# Patient Record
Sex: MALE | NOT HISPANIC OR LATINO
[De-identification: names, ages, dates, MRNs, and addresses within clinical notes are randomized per-mention and may not be internally consistent; named-entity substitution may affect disease eponyms.]

---

## 2017-08-07 ENCOUNTER — OTHER (OUTPATIENT)
Age: 82
End: 2017-08-07

## 2017-08-07 DIAGNOSIS — R93.8 ABNORMAL FINDINGS ON DIAGNOSTIC IMAGING OF OTHER SPECIFIED BODY STRUCTURES: ICD-10-CM

## 2021-09-07 ENCOUNTER — OUTPATIENT (OUTPATIENT)
Dept: OUTPATIENT SERVICES | Facility: HOSPITAL | Age: 86
LOS: 1 days | End: 2021-09-07
Payer: MEDICARE

## 2021-09-07 ENCOUNTER — APPOINTMENT (OUTPATIENT)
Dept: RADIOLOGY | Facility: CLINIC | Age: 86
End: 2021-09-07
Payer: MEDICARE

## 2021-09-07 DIAGNOSIS — R05 COUGH: ICD-10-CM

## 2021-09-07 PROCEDURE — 71046 X-RAY EXAM CHEST 2 VIEWS: CPT | Mod: 26

## 2021-09-07 PROCEDURE — 71046 X-RAY EXAM CHEST 2 VIEWS: CPT

## 2021-09-08 ENCOUNTER — NON-APPOINTMENT (OUTPATIENT)
Age: 86
End: 2021-09-08

## 2021-09-08 ENCOUNTER — APPOINTMENT (OUTPATIENT)
Dept: PULMONOLOGY | Facility: CLINIC | Age: 86
End: 2021-09-08
Payer: MEDICARE

## 2021-09-08 VITALS
WEIGHT: 170 LBS | SYSTOLIC BLOOD PRESSURE: 124 MMHG | HEIGHT: 71.5 IN | DIASTOLIC BLOOD PRESSURE: 62 MMHG | BODY MASS INDEX: 23.28 KG/M2

## 2021-09-08 VITALS — OXYGEN SATURATION: 97 % | HEART RATE: 76 BPM | RESPIRATION RATE: 16 BRPM

## 2021-09-08 DIAGNOSIS — I34.0 NONRHEUMATIC MITRAL (VALVE) INSUFFICIENCY: ICD-10-CM

## 2021-09-08 DIAGNOSIS — I35.0 NONRHEUMATIC AORTIC (VALVE) STENOSIS: ICD-10-CM

## 2021-09-08 DIAGNOSIS — I48.91 UNSPECIFIED ATRIAL FIBRILLATION: ICD-10-CM

## 2021-09-08 DIAGNOSIS — R05 COUGH: ICD-10-CM

## 2021-09-08 DIAGNOSIS — E78.5 HYPERLIPIDEMIA, UNSPECIFIED: ICD-10-CM

## 2021-09-08 PROCEDURE — 99204 OFFICE O/P NEW MOD 45 MIN: CPT

## 2021-09-08 RX ORDER — UBIDECARENONE/VIT E ACET 100MG-5
CAPSULE ORAL
Refills: 0 | Status: ACTIVE | COMMUNITY

## 2021-09-08 RX ORDER — PSYLLIUM HUSK 0.4 G
CAPSULE ORAL
Refills: 0 | Status: ACTIVE | COMMUNITY

## 2021-09-08 RX ORDER — METOPROLOL TARTRATE 25 MG/1
25 TABLET, FILM COATED ORAL
Refills: 0 | Status: ACTIVE | COMMUNITY

## 2021-09-08 RX ORDER — CALCIUM CITRATE/VITAMIN D3 315MG-6.25
315-200 TABLET ORAL
Refills: 0 | Status: ACTIVE | COMMUNITY

## 2021-09-08 RX ORDER — RIVAROXABAN 20 MG/1
20 TABLET, FILM COATED ORAL
Refills: 0 | Status: ACTIVE | COMMUNITY

## 2021-09-08 NOTE — HISTORY OF PRESENT ILLNESS
[Never] : never [TextBox_4] : 88-year-old male last seen in this office greater than 5 years ago, seen today for complaints of a cough over the course of the week. 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 One month ago, treated with successful cardioversion. Followup EDDIE performed at that time demonstrated severe mitral regurgitation, and the patient is having a mitral valve clip.\par \par Patient seen today in reevaluation for complaints of cough, which began to have weeks ago. He was attending a wedding and subsequently developed pharyngitis with cough, productive of yellow sputum. He underwent Covid 19 testing x3, which was negative and is completing a course of cefpodoxime proxetil and azithromycin. He denies any fevers, chills, myalgias, arthralgias. His cough has significantly improved. There is no worsening with sleep or sleep disturbance. History is negative for leg edema, proximal nocturnal dyspnea, or orthopnea.

## 2021-09-08 NOTE — PROCEDURE
[FreeTextEntry1] : 9/7/21: [facility] chest x-ray-increased pulmonary vascular congestion with cephalization of vessels. No evidence of pleural effusions. No Masses or infiltrates. Films reviewed on PACS with patient\par \par Cardiology evaluation from 9/7/21 was obtained and reviewed

## 2021-09-08 NOTE — CONSULT LETTER
[Dear  ___] : Dear  [unfilled], [Consult Letter:] : I had the pleasure of evaluating your patient, [unfilled]. [Please see my note below.] : Please see my note below. [Consult Closing:] : Thank you very much for allowing me to participate in the care of this patient.  If you have any questions, please do not hesitate to contact me. [Sincerely,] : Sincerely, [DrWilliam  ___] : Dr. MUNOZ [FreeTextEntry3] : Alex Pisano MD FCCP\par Pulmonary/Critical Care/Sleep Medicine\par Department of Internal Medicine\par \par [employer] [facility] [employer]\par

## 2021-09-08 NOTE — PHYSICAL EXAM
[No Acute Distress] : no acute distress [Normal Oropharynx] : normal oropharynx [Normal Appearance] : normal appearance [No Neck Mass] : no neck mass [Normal Rate/Rhythm] : normal rate/rhythm [Normal S1, S2] : normal s1, s2 [No Murmurs] : no murmurs [No Resp Distress] : no resp distress [No Abnormalities] : no abnormalities [Benign] : benign [Normal Gait] : normal gait [No Clubbing] : no clubbing [No Cyanosis] : no cyanosis [No Edema] : no edema [FROM] : FROM [Normal Color/ Pigmentation] : normal color/ pigmentation [No Focal Deficits] : no focal deficits [Oriented x3] : oriented x3 [Normal Affect] : normal affect [TextBox_68] : scattered expiratory rhonchi and wheezes at both bases

## 2021-09-08 NOTE — DISCUSSION/SUMMARY
[FreeTextEntry1] : 88-year-old male, seen today for the above. Patient complains most likely the basis of community-acquired bronchitis with possible reactive airways disease. Questionable component of sinusitis. I did suggest the use of steroids to expedite clearance, but the patient wishes to hold off. Over-the-counter expectorant with mild cough suppressant will be started.\par \par Severe mitral regurgitation with evidence of pulmonary vascular congestion on chest x-ray. No evidence of overt heart failure. Have strongly advised on proceeding with mitral valve repair.

## 2022-08-30 ENCOUNTER — NON-APPOINTMENT (OUTPATIENT)
Age: 87
End: 2022-08-30

## 2023-05-04 ENCOUNTER — OUTPATIENT (OUTPATIENT)
Dept: OUTPATIENT SERVICES | Facility: HOSPITAL | Age: 88
LOS: 1 days | Discharge: HOME | End: 2023-05-04

## 2023-05-04 DIAGNOSIS — C79.82 SECONDARY MALIGNANT NEOPLASM OF GENITAL ORGANS: ICD-10-CM

## 2023-05-05 ENCOUNTER — NON-APPOINTMENT (OUTPATIENT)
Age: 88
End: 2023-05-05

## 2023-05-08 ENCOUNTER — APPOINTMENT (OUTPATIENT)
Dept: HEMATOLOGY ONCOLOGY | Facility: CLINIC | Age: 88
End: 2023-05-08
Payer: MEDICARE

## 2023-05-08 ENCOUNTER — TRANSCRIPTION ENCOUNTER (OUTPATIENT)
Age: 88
End: 2023-05-08

## 2023-05-08 ENCOUNTER — RESULT REVIEW (OUTPATIENT)
Age: 88
End: 2023-05-08

## 2023-05-08 ENCOUNTER — OUTPATIENT (OUTPATIENT)
Dept: OUTPATIENT SERVICES | Facility: HOSPITAL | Age: 88
LOS: 1 days | End: 2023-05-08
Payer: MEDICARE

## 2023-05-08 VITALS
SYSTOLIC BLOOD PRESSURE: 138 MMHG | HEART RATE: 64 BPM | RESPIRATION RATE: 16 BRPM | DIASTOLIC BLOOD PRESSURE: 61 MMHG | OXYGEN SATURATION: 98 % | TEMPERATURE: 98 F

## 2023-05-08 VITALS
HEIGHT: 71.5 IN | OXYGEN SATURATION: 91 % | DIASTOLIC BLOOD PRESSURE: 74 MMHG | HEART RATE: 82 BPM | SYSTOLIC BLOOD PRESSURE: 127 MMHG | TEMPERATURE: 97.9 F | BODY MASS INDEX: 22.4 KG/M2 | WEIGHT: 163.56 LBS

## 2023-05-08 VITALS
SYSTOLIC BLOOD PRESSURE: 119 MMHG | DIASTOLIC BLOOD PRESSURE: 85 MMHG | RESPIRATION RATE: 16 BRPM | OXYGEN SATURATION: 100 % | HEART RATE: 57 BPM

## 2023-05-08 DIAGNOSIS — R06.02 SHORTNESS OF BREATH: ICD-10-CM

## 2023-05-08 LAB
ANION GAP SERPL CALC-SCNC: 10 MMOL/L — SIGNIFICANT CHANGE UP (ref 5–17)
BASOPHILS # BLD AUTO: 0.05 K/UL — SIGNIFICANT CHANGE UP (ref 0–0.2)
BASOPHILS # BLD AUTO: 0.1 K/UL — SIGNIFICANT CHANGE UP (ref 0–0.2)
BASOPHILS NFR BLD AUTO: 0.8 % — SIGNIFICANT CHANGE UP (ref 0–2)
BASOPHILS NFR BLD AUTO: 0.9 % — SIGNIFICANT CHANGE UP (ref 0–2)
BUN SERPL-MCNC: 45.2 MG/DL — HIGH (ref 8–20)
CALCIUM SERPL-MCNC: 9.2 MG/DL — SIGNIFICANT CHANGE UP (ref 8.4–10.5)
CHLORIDE SERPL-SCNC: 102 MMOL/L — SIGNIFICANT CHANGE UP (ref 96–108)
CO2 SERPL-SCNC: 27 MMOL/L — SIGNIFICANT CHANGE UP (ref 22–29)
CREAT SERPL-MCNC: 1.6 MG/DL — HIGH (ref 0.5–1.3)
EGFR: 41 ML/MIN/1.73M2 — LOW
EOSINOPHIL # BLD AUTO: 0.2 K/UL — SIGNIFICANT CHANGE UP (ref 0–0.5)
EOSINOPHIL # BLD AUTO: 0.26 K/UL — SIGNIFICANT CHANGE UP (ref 0–0.5)
EOSINOPHIL NFR BLD AUTO: 2.9 % — SIGNIFICANT CHANGE UP (ref 0–6)
EOSINOPHIL NFR BLD AUTO: 4.8 % — SIGNIFICANT CHANGE UP (ref 0–6)
GLUCOSE SERPL-MCNC: 101 MG/DL — HIGH (ref 70–99)
HCT VFR BLD CALC: 35.9 % — LOW (ref 39–50)
HCT VFR BLD CALC: 36.1 % — LOW (ref 39–50)
HGB BLD-MCNC: 11.8 G/DL — LOW (ref 13–17)
HGB BLD-MCNC: 11.9 G/DL — LOW (ref 13–17)
IMM GRANULOCYTES NFR BLD AUTO: 0.2 % — SIGNIFICANT CHANGE UP (ref 0–0.9)
LYMPHOCYTES # BLD AUTO: 1.1 K/UL — SIGNIFICANT CHANGE UP (ref 1–3.3)
LYMPHOCYTES # BLD AUTO: 1.16 K/UL — SIGNIFICANT CHANGE UP (ref 1–3.3)
LYMPHOCYTES # BLD AUTO: 16.5 % — SIGNIFICANT CHANGE UP (ref 13–44)
LYMPHOCYTES # BLD AUTO: 21.5 % — SIGNIFICANT CHANGE UP (ref 13–44)
MAGNESIUM SERPL-MCNC: 2.4 MG/DL — SIGNIFICANT CHANGE UP (ref 1.6–2.6)
MCHC RBC-ENTMCNC: 29.5 PG — SIGNIFICANT CHANGE UP (ref 27–34)
MCHC RBC-ENTMCNC: 29.8 PG — SIGNIFICANT CHANGE UP (ref 27–34)
MCHC RBC-ENTMCNC: 32.8 G/DL — SIGNIFICANT CHANGE UP (ref 32–36)
MCHC RBC-ENTMCNC: 33.1 GM/DL — SIGNIFICANT CHANGE UP (ref 32–36)
MCV RBC AUTO: 88.9 FL — SIGNIFICANT CHANGE UP (ref 80–100)
MCV RBC AUTO: 90.8 FL — SIGNIFICANT CHANGE UP (ref 80–100)
MONOCYTES # BLD AUTO: 0.65 K/UL — SIGNIFICANT CHANGE UP (ref 0–0.9)
MONOCYTES # BLD AUTO: 0.7 K/UL — SIGNIFICANT CHANGE UP (ref 0–0.9)
MONOCYTES NFR BLD AUTO: 10.7 % — SIGNIFICANT CHANGE UP (ref 2–14)
MONOCYTES NFR BLD AUTO: 12.1 % — SIGNIFICANT CHANGE UP (ref 2–14)
NEUTROPHILS # BLD AUTO: 3.26 K/UL — SIGNIFICANT CHANGE UP (ref 1.8–7.4)
NEUTROPHILS # BLD AUTO: 4.6 K/UL — SIGNIFICANT CHANGE UP (ref 1.8–7.4)
NEUTROPHILS NFR BLD AUTO: 60.5 % — SIGNIFICANT CHANGE UP (ref 43–77)
NEUTROPHILS NFR BLD AUTO: 69 % — SIGNIFICANT CHANGE UP (ref 43–77)
PLATELET # BLD AUTO: 176 K/UL — SIGNIFICANT CHANGE UP (ref 150–400)
PLATELET # BLD AUTO: 177 K/UL — SIGNIFICANT CHANGE UP (ref 150–400)
POTASSIUM SERPL-MCNC: 4.4 MMOL/L — SIGNIFICANT CHANGE UP (ref 3.5–5.3)
POTASSIUM SERPL-SCNC: 4.4 MMOL/L — SIGNIFICANT CHANGE UP (ref 3.5–5.3)
RBC # BLD: 3.98 M/UL — LOW (ref 4.2–5.8)
RBC # BLD: 4.04 M/UL — LOW (ref 4.2–5.8)
RBC # FLD: 14.3 % — SIGNIFICANT CHANGE UP (ref 10.3–14.5)
RBC # FLD: 15.1 % — HIGH (ref 10.3–14.5)
SODIUM SERPL-SCNC: 139 MMOL/L — SIGNIFICANT CHANGE UP (ref 135–145)
WBC # BLD: 5.39 K/UL — SIGNIFICANT CHANGE UP (ref 3.8–10.5)
WBC # BLD: 6.7 K/UL — SIGNIFICANT CHANGE UP (ref 3.8–10.5)
WBC # FLD AUTO: 5.39 K/UL — SIGNIFICANT CHANGE UP (ref 3.8–10.5)
WBC # FLD AUTO: 6.7 K/UL — SIGNIFICANT CHANGE UP (ref 3.8–10.5)

## 2023-05-08 PROCEDURE — 93005 ELECTROCARDIOGRAM TRACING: CPT

## 2023-05-08 PROCEDURE — 93312 ECHO TRANSESOPHAGEAL: CPT

## 2023-05-08 PROCEDURE — 36415 COLL VENOUS BLD VENIPUNCTURE: CPT

## 2023-05-08 PROCEDURE — 85025 COMPLETE CBC W/AUTO DIFF WBC: CPT

## 2023-05-08 PROCEDURE — 93320 DOPPLER ECHO COMPLETE: CPT

## 2023-05-08 PROCEDURE — 80048 BASIC METABOLIC PNL TOTAL CA: CPT

## 2023-05-08 PROCEDURE — 93010 ELECTROCARDIOGRAM REPORT: CPT

## 2023-05-08 PROCEDURE — 93325 DOPPLER ECHO COLOR FLOW MAPG: CPT

## 2023-05-08 PROCEDURE — 83735 ASSAY OF MAGNESIUM: CPT

## 2023-05-08 PROCEDURE — 99204 OFFICE O/P NEW MOD 45 MIN: CPT

## 2023-05-08 RX ORDER — METOPROLOL TARTRATE 50 MG
1 TABLET ORAL
Refills: 0 | DISCHARGE

## 2023-05-08 RX ORDER — FUROSEMIDE 40 MG
1 TABLET ORAL
Refills: 0 | DISCHARGE

## 2023-05-08 RX ORDER — CHLORHEXIDINE GLUCONATE 213 G/1000ML
1 SOLUTION TOPICAL ONCE
Refills: 0 | Status: DISCONTINUED | OUTPATIENT
Start: 2023-05-08 | End: 2023-05-22

## 2023-05-08 RX ORDER — AZELASTINE HYDROCHLORIDE AND FLUTICASONE PROPIONATE 137; 50 UG/1; UG/1
1 SPRAY, METERED NASAL
Refills: 0 | DISCHARGE

## 2023-05-08 RX ORDER — BICALUTAMIDE 50 MG/1
50 TABLET ORAL DAILY
Qty: 30 | Refills: 0 | Status: ACTIVE | COMMUNITY
Start: 2023-05-08 | End: 1900-01-01

## 2023-05-08 RX ORDER — ASPIRIN/CALCIUM CARB/MAGNESIUM 324 MG
1 TABLET ORAL
Refills: 0 | DISCHARGE

## 2023-05-08 NOTE — DISCHARGE NOTE PROVIDER - NSDCMRMEDTOKEN_GEN_ALL_CORE_FT
aspirin 81 mg oral tablet: 1 tab(s) orally once a day  azelastine-fluticasone 137 mcg-50 mcg/inh nasal spray: 1 spray(s) intranasally 2 times a day  furosemide 40 mg oral tablet: 1 orally once a day  Toprol-XL 25 mg oral tablet, extended release: 1 orally once a day

## 2023-05-08 NOTE — H&P PST ADULT - HISTORY OF PRESENT ILLNESS
Department of Cardiology                                                                  [facility]/[place] [address] [address] [facility]                                                            Telephone: [phone]. Fax:[phone]                                                                                     Pre-EDDIE Note/H and P        Narrative:  89yo female with h/o PAF, prior RFA for A Flutter, PSVT, SSS-DC PPM, MV disease with prior MV repair and ELIESER ligation (not on A/C due to prior GIB), subsequent TMVR, CKD **, on 5/2/23 episode of amaurosis with visual field loss and lower field hemianopsia, seen by cardiologist Dr French, EDDIE with LVEF 48%, mild global diffuse hypokinesis, +LVH, septal flattening suggestive of RV overload, bio MVR with mobile echogenic structure appears to be attached to the strut adjacent to the LVOT, trace MR, mod-severe TR, now presenting for EDDIE to eval possible cardioembolic source of CVA/better assess mobile lesion on bio-AVR      ASA and Mallampati: Per Anesthesia    	  MEDICATIONS:  chlorhexidine 4% Liquid 1 Application(s) Topical once        Constitutional: A & O x 3  HEENT:   Normal oral mucosa, PERRL, EOMI	  Cardiovascular: Normal S1 S2, No JVD, No murmurs, No edema  Respiratory: Lungs clear to auscultation	  Gastrointestinal:  Soft, Non-tender, + BS	  Skin: No rashes, No ecchymoses, No cyanosis  Neurologic: Non-focal  Extremities: Normal range of motion, No clubbing, cyanosis or edema  Vascular: Peripheral pulses palpable 2+ bilaterally    TELEMETRY: 	      ECG:  	    Diagnsotics:    LABS:	 	    CARDIAC MARKERS:                    proBNP:   Lipid Profile:   HgA1c:   TSH:     ASSESSMENT:    -EDDIE as ordered  -Labs and ECG reviewed  -Procedure discussed with patient; risks and benefits explained; questions answered  -Consent obtained by Echocardiographer and anesthesiologist                                                                         Department of Cardiology                                                                  [facility]/[place] [address] [address] [facility]                                                            Telephone: [phone]. Fax:[phone]                                                                                     Pre-EDDIE Note/H and P        Narrative:  89yo female with h/o PAF, prior RFA for A Flutter, PSVT, SSS-DC PPM, MV disease with prior MV repair and ELIESER ligation (not on A/C due to prior GIB), subsequent TMVR, CKD **, on 5/2/23 episode of amaurosis with visual field loss and lower field hemianopsia, seen by cardiologist Dr French, EDDIE with LVEF 48%, mild global diffuse hypokinesis, +LVH, septal flattening suggestive of RV overload, bio MVR with mobile echogenic structure appears to be attached to the strut adjacent to the LVOT, trace MR, mod-severe TR, now presenting for EDDIE to eval possible cardioembolic source of recent neurologic event and better assess VHD and mobile lesion on bio-AVR.    Of note, Mr Russo was dx with prostate cancer 2001 that was treated with seeds and RT, recurrence in 2007 and receiving intermittent Lupron injections. Now with recurrent elevation of the PSA and PET scan reveals mets to the bone with lymph node involvement.       ASA and Mallampati: Per Anesthesia    Home Medications:    Constitutional: A & O x 3  HEENT:   Normal oral mucosa, PERRL, EOMI	  Cardiovascular: Normal S1 S2, No JVD, No murmurs, No edema  Respiratory: Lungs clear to auscultation	  Gastrointestinal:  Soft, Non-tender, + BS	  Skin: No rashes, No ecchymoses, No cyanosis  Neurologic: Non-focal  Extremities: Normal range of motion, No clubbing, cyanosis or edema  Vascular: Peripheral pulses palpable 2+ bilaterally        ECG: Paced  	    TTE:  LVEF 48%, mild global diffuse hypokinesis, +LVH, septal flattening suggestive of RV overload, bio MVR with mobile echogenic structure appears to be attached to the strut adjacent to the LVOT, trace MR, mod-severe TR,    LABS:	 	                        11.9   5.39  )-----------( 177      ( 08 May 2023 08:41 )             35.9   05-08    139  |  102  |  45.2<H>  ----------------------------<  101<H>  4.4   |  27.0  |  1.60<H>    Ca    9.2      08 May 2023 08:41  Mg     2.4     05-08                                                                               Department of Cardiology                                                                  [facility]/[place] [address] [address] [facility]                                                            Telephone: [phone]. Fax:[phone]                                                                                     Pre-EDDIE Note/H and P        Narrative:  89yo male with h/o PAF, prior RFA for A Flutter, PSVT, SSS-DC PPM, MV disease with prior MV repair and ELIESER ligation (not on A/C due to prior GIB), subsequent TMVR, CKD 3, on 5/2/23 episode of amaurosis with visual field loss and lower field hemianopsia, seen by cardiologist Dr French, EDDIE with LVEF 48%, mild global diffuse hypokinesis, +LVH, septal flattening suggestive of RV overload, bio MVR with mobile echogenic structure appears to be attached to the strut adjacent to the LVOT, trace MR, mod-severe TR, now presenting for EDDIE to eval possible cardioembolic source of recent neurologic event and better assess VHD and mobile lesion on bio-AVR.    Of note, Mr Russo was dx with prostate cancer 2001 that was treated with seeds and RT, recurrence in 2007 and receiving intermittent Lupron injections. Now with recurrent elevation of the PSA and PET scan reveals mets to the bone with lymph node involvement.       ASA and Mallampati: Per Anesthesia    Home Medications:    Constitutional: A & O x 3  HEENT:   Normal oral mucosa, PERRL, EOMI	  Cardiovascular: Normal S1 S2, No JVD, No murmurs, No edema  Respiratory: Lungs clear to auscultation	  Gastrointestinal:  Soft, Non-tender, + BS	  Skin: No rashes, No ecchymoses, No cyanosis  Neurologic: Non-focal  Extremities: Normal range of motion, No clubbing, cyanosis or edema  Vascular: Peripheral pulses palpable 2+ bilaterally        ECG: Paced  	    TTE:  LVEF 48%, mild global diffuse hypokinesis, +LVH, septal flattening suggestive of RV overload, bio MVR with mobile echogenic structure appears to be attached to the strut adjacent to the LVOT, trace MR, mod-severe TR,    LABS:	 	                        11.9   5.39  )-----------( 177      ( 08 May 2023 08:41 )             35.9   05-08    139  |  102  |  45.2<H>  ----------------------------<  101<H>  4.4   |  27.0  |  1.60<H>    Ca    9.2      08 May 2023 08:41  Mg     2.4     05-08

## 2023-05-08 NOTE — DISCHARGE NOTE NURSING/CASE MANAGEMENT/SOCIAL WORK - PATIENT PORTAL LINK FT
You can access the FollowMyHealth Patient Portal offered by [facility] by registering at the following website: [email or web address]. By joining [employer]’s FollowMyHealth portal, you will also be able to view your health information using other applications (apps) compatible with our system.

## 2023-05-08 NOTE — DISCHARGE NOTE PROVIDER - CARE PROVIDER_API CALL
PUMA BISHOP  Cardiology  [address] [place] [address].  [place] [address]  Phone: [phone]  Fax: [phone]  Follow Up Time:

## 2023-05-08 NOTE — PROGRESS NOTE ADULT - SUBJECTIVE AND OBJECTIVE BOX
[facility]                                   [address] [place] [address]                                                      PHONE: [phone]                                                         FAX: [phone]  [email or web address]  ---------------------------------------------------------------------------------------------------------------------------------    Overnight events/patient complaints: here for EDDIE in eval of ? TIA - off eliquis due to episode of rectal bleeding      No Known Allergies    MEDICATIONS  (STANDING):  chlorhexidine 4% Liquid 1 Application(s) Topical once    MEDICATIONS  (PRN):      Vital Signs Last 24 Hrs  T(C): --  T(F): --  HR: --  BP: --  BP(mean): --  RR: --  SpO2: --      Daily     Daily   ICU Vital Signs Last 24 Hrs  PAUL RODRIGUEZ  I&O's Detail    I&O's Summary    Drug Dosing Weight  FLORAENT MICHAEL      PHYSICAL EXAM:  General: Appears alert and cooperative.  HEENT: Head; normocephalic, atraumatic.  Eyes: Pupils reactive, cornea wnl.  Neck: Supple, no nodes adenopathy, no NVD or carotid bruit or thyromegaly.  CARDIOVASCULAR: Normal S1 and S2, No murmur, rub, gallop or lift.   LUNGS: No rales, rhonchi or wheeze. Normal breath sounds bilaterally.  ABDOMEN: Soft, nontender without mass or organomegaly. bowel sounds normoactive.  EXTREMITIES: No clubbing, cyanosis or edema. Distal pulses wnl.   SKIN: warm and dry with normal turgor.  NEURO: Alert/oriented x 3/normal motor exam. No pathologic reflexes.    PSYCH: normal affect.        LABS:                        11.9   5.39  )-----------( 177      ( 08 May 2023 08:41 )             35.9     05-08    139  |  102  |  45.2<H>  ----------------------------<  101<H>  4.4   |  27.0  |  1.60<H>    Ca    9.2      08 May 2023 08:41  Mg     2.4     05-08      EDDIE performed with anesthesia sedation  Pt tolerated well  Results:  LVEF 50-55%  s/p Bio MVR with normal function, trace paravalvular MR  calcified mobile  chordae  SEC in LA w/o thrombus  ELIESER ligated  Aortic sclerosis with trace AI  Enalrged RA/RV with severe TR est PAP 50-55 mm Hg  Short tunneled PFO with Grade 2 bidirectional shunting  Normal aorta

## 2023-05-08 NOTE — PROGRESS NOTE ADULT - ASSESSMENT
Assessment  ?TIA  PAF not AC candidate due to rectal bleeding  s/p transcatheter MVR with normal function  s/p ELIESER ligation/resection  Severe TR mod to severe pul HTN  Incidental PFO  prostate cancer with recent dx of metastases      Rec  remain off eleiquis  cont antiplt therapy  FU with DR French 2-3 weeks

## 2023-05-08 NOTE — DISCHARGE NOTE PROVIDER - HOSPITAL COURSE
Narrative:  88yo female with h/o PAF, prior RFA for A Flutter, PSVT, SSS-DC PPM, MV disease with prior MV repair and ELIESER ligation (not on A/C due to prior GIB), subsequent TMVR, CKD **, on 5/2/23 episode of amaurosis with visual field loss and lower field hemianopsia, seen by cardiologist Dr French, EDDIE with LVEF 48%, mild global diffuse hypokinesis, +LVH, septal flattening suggestive of RV overload, bio MVR with mobile echogenic structure appears to be attached to the strut adjacent to the LVOT, trace MR, mod-severe TR, now presenting for EDDIE to eval possible cardioembolic source of recent neurologic event and better assess VHD and mobile lesion on bio-AVR.    Of note, Mr Russo was dx with prostate cancer 2001 that was treated with seeds and RT, recurrence in 2007 and receiving intermittent Lupron injections. Now with recurrent elevation of the PSA and PET scan reveals mets to the bone with lymph node involvement. He has an appt with his oncologist later today.    Now s/p EDDIE with lido, tolerated procedure well, arrived to recovery in NAD and HDS, plan for D/C after post EDDIE recovery.    -Post EDDIE management/monitoring per protocol  -Maintain NPO until return of gag reflex  -D/C home after recovery  -Follow up with Dr French     EDDIE Summary:   1. Left ventricular ejection fraction, by visual estimation, is 50 to   55%.   2. Elevated mean left atrial pressure.   3. The left ventricular diastolic function could not be assessed in this   study.   4. Mildly reduced RV systolic function.   5. Moderately enlarged right ventricle.   6. Moderately enlarged LA . No discernible ELIESER s/p ligation. SEC in LA   w/o thrombus.   7. Moderately enlarged left atrium.   8. Moderately enlarged right atrium.   9. There is no evidence of pericardial effusion.  10. Severe tricuspid regurgitation.  11. Dilated RV/TVA with poor leaflet coaptation and severe TR. Est PAP   50-55 mm Hg.  12. Sclerotic aortic valve with normal opening.  13. Mild pulmonic valve regurgitation.  14. Estimated pulmonary artery systolic pressure is 53.6 mmHg assuming a   right atrial pressure of 10 mmHg, which is consistent with moderate   pulmonary hypertension.  15. Short narrow tunneled PFO with Grade 2 bidrectional shunting on color   flow/contrast injection.            Narrative:  89yo male with h/o PAF, prior RFA for A Flutter, PSVT, SSS-DC PPM, MV disease with prior MV repair and ELIESER ligation (not on A/C due to prior GIB), subsequent TMVR, CKD 3, on 5/2/23 episode of amaurosis with visual field loss and lower field hemianopsia, seen by cardiologist Dr French, EDDIE with LVEF 48%, mild global diffuse hypokinesis, +LVH, septal flattening suggestive of RV overload, bio MVR with mobile echogenic structure appears to be attached to the strut adjacent to the LVOT, trace MR, mod-severe TR, now presenting for EDDIE to eval possible cardioembolic source of recent neurologic event and better assess VHD and mobile lesion on bio-AVR.    Of note, Mr Russo was dx with prostate cancer 2001 that was treated with seeds and RT, recurrence in 2007 and receiving intermittent Lupron injections. Now with recurrent elevation of the PSA and PET scan reveals mets to the bone with lymph node involvement. He has an appt with his oncologist later today.    Now s/p EDDIE with lido, tolerated procedure well, arrived to recovery in NAD and HDS, plan for D/C after post EDDIE recovery.    -Post EDDIE management/monitoring per protocol  -Maintain NPO until return of gag reflex  -D/C home after recovery  -Follow up with Dr French     EDDIE Summary:   1. Left ventricular ejection fraction, by visual estimation, is 50 to   55%.   2. Elevated mean left atrial pressure.   3. The left ventricular diastolic function could not be assessed in this   study.   4. Mildly reduced RV systolic function.   5. Moderately enlarged right ventricle.   6. Moderately enlarged LA . No discernible ELIESER s/p ligation. SEC in LA   w/o thrombus.   7. Moderately enlarged left atrium.   8. Moderately enlarged right atrium.   9. There is no evidence of pericardial effusion.  10. Severe tricuspid regurgitation.  11. Dilated RV/TVA with poor leaflet coaptation and severe TR. Est PAP   50-55 mm Hg.  12. Sclerotic aortic valve with normal opening.  13. Mild pulmonic valve regurgitation.  14. Estimated pulmonary artery systolic pressure is 53.6 mmHg assuming a   right atrial pressure of 10 mmHg, which is consistent with moderate   pulmonary hypertension.  15. Short narrow tunneled PFO with Grade 2 bidrectional shunting on color   flow/contrast injection.

## 2023-05-08 NOTE — DISCHARGE NOTE NURSING/CASE MANAGEMENT/SOCIAL WORK - NSDCPEFALRISK_GEN_ALL_CORE
For information on Fall & Injury Prevention, visit: [email or web address] OR  [email or web address] OR  https://www.cdc.gov/steadi/patient.html 3

## 2023-05-08 NOTE — DISCHARGE NOTE PROVIDER - NSDCCPCAREPLAN_GEN_ALL_CORE_FT
PRINCIPAL DISCHARGE DIAGNOSIS  Diagnosis: Neurological symptoms  Assessment and Plan of Treatment: The EDDIE ruled out any cardiac source for recent neurologic event. Report to ED with any acute onset of visual changes, difficulty with speech or swallowing, numbness/tingling/weakness on one side, facial droop or numbness, dizziness or change in mental status.

## 2023-05-08 NOTE — DISCHARGE NOTE PROVIDER - NSDCCPTREATMENT_GEN_ALL_CORE_FT
PRINCIPAL PROCEDURE  Procedure: Transesophageal echocardiogram  Findings and Treatment: Do not drive or operate machinery today as you have received sedation. You should take it easy today and take your time, especially when changing positions. Follow up with  *** to further discuss the results of the EDDIE and any further evaluations and recommendations going forward.     PRINCIPAL PROCEDURE  Procedure: Transesophageal echocardiogram  Findings and Treatment: Do not drive or operate machinery today as you have received sedation. You should take it easy today and take your time, especially when changing positions. Follow up with Dr. French to further discuss the results of the EDDIE and any further evaluations and recommendations going forward.

## 2023-05-08 NOTE — DISCHARGE NOTE PROVIDER - NSDCFUADDINST_GEN_ALL_CORE_FT
Do not drive or operate machinery today as you have received sedation. You should take it easy today and take your time, especially when changing positions. Follow up with  *** to further discuss the results of the EDDIE and any further evaluations and recommendations going forward. Do not drive or operate machinery today as you have received sedation. You should take it easy today and take your time, especially when changing positions. Follow up with Dr. French to further discuss the results of the EDDIE and any further evaluations and recommendations going forward.

## 2023-05-08 NOTE — H&P PST ADULT - ASSESSMENT
ASSESSMENT: 88yo male with recent neuro event characterized by amaurosis with visual field loss and lower field hemianopsia, seen by cardiologist Dr French, EDDIE with LVEF 48%, mild global diffuse hypokinesis, +LVH, septal flattening suggestive of RV overload, bio MVR with mobile echogenic structure appears to be attached to the strut adjacent to the LVOT, trace MR, mod-severe TR, now presenting for EDDIE to eval possible cardioembolic source of recent neurologic event and better assess VHD and mobile lesion on bio-AVR.      -EDDIE as ordered  -Labs and ECG reviewed  -Procedure discussed with patient; risks and benefits explained; questions answered  -Consent obtained by Echocardiographer and anesthesiologist

## 2023-05-09 LAB
ALBUMIN SERPL ELPH-MCNC: 4.6 G/DL
ALP BLD-CCNC: 101 U/L
ALT SERPL-CCNC: 13 U/L
ANION GAP SERPL CALC-SCNC: 13 MMOL/L
AST SERPL-CCNC: 24 U/L
BILIRUB SERPL-MCNC: 0.5 MG/DL
BUN SERPL-MCNC: 48 MG/DL
CALCIUM SERPL-MCNC: 9.6 MG/DL
CHLORIDE SERPL-SCNC: 104 MMOL/L
CO2 SERPL-SCNC: 28 MMOL/L
CREAT SERPL-MCNC: 1.94 MG/DL
EGFR: 32 ML/MIN/1.73M2
GLUCOSE SERPL-MCNC: 96 MG/DL
HBV CORE IGG+IGM SER QL: NONREACTIVE
HBV SURFACE AB SER QL: REACTIVE
HBV SURFACE AG SER QL: NONREACTIVE
HCV AB SER QL: NONREACTIVE
HCV S/CO RATIO: 0.13 S/CO
POTASSIUM SERPL-SCNC: 4.7 MMOL/L
PROT SERPL-MCNC: 7.7 G/DL
PSA SERPL-MCNC: 12.7 NG/ML
SODIUM SERPL-SCNC: 144 MMOL/L
TESTOST SERPL-MCNC: 8.8 NG/DL

## 2023-05-09 NOTE — REVIEW OF SYSTEMS
[Negative] : Gastrointestinal [FreeTextEntry9] : Mild to moderate right posterolateral thorax discomfort

## 2023-05-09 NOTE — REASON FOR VISIT
[Initial Consultation] : an initial consultation [Spouse] : spouse [FreeTextEntry2] : Prostate cancer metastatic to bone

## 2023-05-09 NOTE — HISTORY OF PRESENT ILLNESS
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  He had a recurrence in 2007 that has been treated with intermittent hormonal therapy with Lupron.  He has been off Lupron since the summer 2021.  He has recently had a rise in PSA to a current level of 12.7.\par PSMA PET scan on May 2, 2023 showed numerous lytic bone metastases including a right T7 transverse process lytic lesion with soft tissue component and mild dorsal epidural extension and neuroforaminal involvement.  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  He has chronic renal insufficiency, with current BUN of 48 and creatinine 1.94.

## 2023-05-09 NOTE — ASSESSMENT
[FreeTextEntry1] : Prostate cancer metastatic to bone, in an 89-year-old male with prior history of radiation seed implant insertion, subsequent PSA relapse, and intermittent androgen blockade with Lupron, with last therapy in the summer 2021.\par Now with PSA recurrence to 12.7 and evidence on PSMA PET scan of bone and lymph node metastasis.\par \par Today we instituted Casodex 50 mg daily and in 1 week we will begin the reintroduction of Lupron.\par Enzalutamide will begin next week to be used in conjunction with Lupron.\par Denosumab will also begin next week and will be planned for a dosing schedule of every 3 months
No

## 2023-05-17 ENCOUNTER — APPOINTMENT (OUTPATIENT)
Age: 88
End: 2023-05-17

## 2023-05-18 DIAGNOSIS — C61 MALIGNANT NEOPLASM OF PROSTATE: ICD-10-CM

## 2023-05-18 DIAGNOSIS — C79.51 SECONDARY MALIGNANT NEOPLASM OF BONE: ICD-10-CM

## 2023-06-06 ENCOUNTER — NON-APPOINTMENT (OUTPATIENT)
Age: 88
End: 2023-06-06

## 2023-06-07 ENCOUNTER — APPOINTMENT (OUTPATIENT)
Dept: HEMATOLOGY ONCOLOGY | Facility: CLINIC | Age: 88
End: 2023-06-07
Payer: MEDICARE

## 2023-06-07 ENCOUNTER — RESULT REVIEW (OUTPATIENT)
Age: 88
End: 2023-06-07

## 2023-06-07 VITALS
WEIGHT: 164 LBS | OXYGEN SATURATION: 98 % | SYSTOLIC BLOOD PRESSURE: 131 MMHG | BODY MASS INDEX: 22.46 KG/M2 | TEMPERATURE: 97 F | DIASTOLIC BLOOD PRESSURE: 65 MMHG | HEIGHT: 71.5 IN | HEART RATE: 74 BPM

## 2023-06-07 LAB
BASOPHILS # BLD AUTO: 0.1 K/UL — SIGNIFICANT CHANGE UP (ref 0–0.2)
BASOPHILS NFR BLD AUTO: 1.1 % — SIGNIFICANT CHANGE UP (ref 0–2)
EOSINOPHIL # BLD AUTO: 0.4 K/UL — SIGNIFICANT CHANGE UP (ref 0–0.5)
EOSINOPHIL NFR BLD AUTO: 9.7 % — HIGH (ref 0–6)
HCT VFR BLD CALC: 34.1 % — LOW (ref 39–50)
HGB BLD-MCNC: 11.3 G/DL — LOW (ref 13–17)
LYMPHOCYTES # BLD AUTO: 1 K/UL — SIGNIFICANT CHANGE UP (ref 1–3.3)
LYMPHOCYTES # BLD AUTO: 21.9 % — SIGNIFICANT CHANGE UP (ref 13–44)
MCHC RBC-ENTMCNC: 29.2 PG — SIGNIFICANT CHANGE UP (ref 27–34)
MCHC RBC-ENTMCNC: 33.2 G/DL — SIGNIFICANT CHANGE UP (ref 32–36)
MCV RBC AUTO: 87.9 FL — SIGNIFICANT CHANGE UP (ref 80–100)
MONOCYTES # BLD AUTO: 0.6 K/UL — SIGNIFICANT CHANGE UP (ref 0–0.9)
MONOCYTES NFR BLD AUTO: 13.5 % — SIGNIFICANT CHANGE UP (ref 2–14)
NEUTROPHILS # BLD AUTO: 2.5 K/UL — SIGNIFICANT CHANGE UP (ref 1.8–7.4)
NEUTROPHILS NFR BLD AUTO: 53.9 % — SIGNIFICANT CHANGE UP (ref 43–77)
PLATELET # BLD AUTO: 229 K/UL — SIGNIFICANT CHANGE UP (ref 150–400)
RBC # BLD: 3.88 M/UL — LOW (ref 4.2–5.8)
RBC # FLD: 14.7 % — HIGH (ref 10.3–14.5)
WBC # BLD: 4.6 K/UL — SIGNIFICANT CHANGE UP (ref 3.8–10.5)
WBC # FLD AUTO: 4.6 K/UL — SIGNIFICANT CHANGE UP (ref 3.8–10.5)

## 2023-06-07 PROCEDURE — 99213 OFFICE O/P EST LOW 20 MIN: CPT

## 2023-06-07 NOTE — ASSESSMENT
[FreeTextEntry1] : Prostate cancer metastatic to bone, now on a regimen that includes Lupron, enzalutamide, and denosumab.\par Bicalutamide 50 mg was instituted the week prior to the initiation of Lupron.\par Therapy is being tolerated well.\par Await repeat PSA.

## 2023-06-07 NOTE — HISTORY OF PRESENT ILLNESS
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 He had a recurrence in 2007 that has been treated with intermittent hormonal therapy with Lupron. He has been off Lupron since the summer 2021. He has recently had a rise in PSA to a current level of 12.7.\par PSMA PET scan on May 2, 2023 showed numerous lytic bone metastases including a right T7 transverse process lytic lesion with soft tissue component and mild dorsal epidural extension and neuroforaminal involvement. 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 He has chronic renal insufficiency, with current BUN of 48 and creatinine 1.94. \par  [de-identified] : Intermittent androgen blockade with Lupron was being used for PSA relapse following radiation seed implant, with the last dose of Lupron in summer 2021.\par PSA recurrence to 12.7 in April 2023 with evidence of bone and lymph node metastasis on PSMA PET scan.\par We began Casodex 50 mg daily, reintroduced Lupron, and began enzalutamide.\par Xgeva has been initiated and is being planned for every 3 month schedule.\par Dr. Romo has noted a marked improvement in bone pain and is tolerating therapy well.

## 2023-06-08 ENCOUNTER — TRANSCRIPTION ENCOUNTER (OUTPATIENT)
Age: 88
End: 2023-06-08

## 2023-06-08 LAB
ALBUMIN SERPL ELPH-MCNC: 4.1 G/DL
ALP BLD-CCNC: 90 U/L
ALT SERPL-CCNC: 9 U/L
ANION GAP SERPL CALC-SCNC: 11 MMOL/L
AST SERPL-CCNC: 21 U/L
BILIRUB SERPL-MCNC: 0.6 MG/DL
BUN SERPL-MCNC: 37 MG/DL
CALCIUM SERPL-MCNC: 8.7 MG/DL
CHLORIDE SERPL-SCNC: 106 MMOL/L
CO2 SERPL-SCNC: 24 MMOL/L
CREAT SERPL-MCNC: 1.57 MG/DL
EGFR: 42 ML/MIN/1.73M2
GLUCOSE SERPL-MCNC: 98 MG/DL
POTASSIUM SERPL-SCNC: 5.4 MMOL/L
PROT SERPL-MCNC: 7.2 G/DL
PSA SERPL-MCNC: 1.57 NG/ML
SODIUM SERPL-SCNC: 141 MMOL/L

## 2023-08-09 ENCOUNTER — OUTPATIENT (OUTPATIENT)
Dept: OUTPATIENT SERVICES | Facility: HOSPITAL | Age: 88
LOS: 1 days | Discharge: HOME | End: 2023-08-09

## 2023-08-09 DIAGNOSIS — C79.82 SECONDARY MALIGNANT NEOPLASM OF GENITAL ORGANS: ICD-10-CM

## 2023-08-16 ENCOUNTER — APPOINTMENT (OUTPATIENT)
Age: 88
End: 2023-08-16

## 2023-08-16 ENCOUNTER — RESULT REVIEW (OUTPATIENT)
Age: 88
End: 2023-08-16

## 2023-08-16 LAB
ALBUMIN SERPL ELPH-MCNC: 4.3 G/DL
ALP BLD-CCNC: 59 U/L
ALT SERPL-CCNC: 12 U/L
ANION GAP SERPL CALC-SCNC: 13 MMOL/L
AST SERPL-CCNC: 20 U/L
BASOPHILS # BLD AUTO: 0 K/UL — SIGNIFICANT CHANGE UP (ref 0–0.2)
BASOPHILS NFR BLD AUTO: 0.8 % — SIGNIFICANT CHANGE UP (ref 0–2)
BILIRUB SERPL-MCNC: 0.6 MG/DL
BUN SERPL-MCNC: 29 MG/DL
CALCIUM SERPL-MCNC: 9 MG/DL
CHLORIDE SERPL-SCNC: 104 MMOL/L
CO2 SERPL-SCNC: 25 MMOL/L
CREAT SERPL-MCNC: 1.38 MG/DL
EGFR: 49 ML/MIN/1.73M2
EOSINOPHIL # BLD AUTO: 0.2 K/UL — SIGNIFICANT CHANGE UP (ref 0–0.5)
EOSINOPHIL NFR BLD AUTO: 4 % — SIGNIFICANT CHANGE UP (ref 0–6)
GLUCOSE SERPL-MCNC: 93 MG/DL
HCT VFR BLD CALC: 37.2 % — LOW (ref 39–50)
HGB BLD-MCNC: 12.3 G/DL — LOW (ref 13–17)
LYMPHOCYTES # BLD AUTO: 1.1 K/UL — SIGNIFICANT CHANGE UP (ref 1–3.3)
LYMPHOCYTES # BLD AUTO: 23.9 % — SIGNIFICANT CHANGE UP (ref 13–44)
MCHC RBC-ENTMCNC: 30.4 PG — SIGNIFICANT CHANGE UP (ref 27–34)
MCHC RBC-ENTMCNC: 33.2 G/DL — SIGNIFICANT CHANGE UP (ref 32–36)
MCV RBC AUTO: 91.6 FL — SIGNIFICANT CHANGE UP (ref 80–100)
MONOCYTES # BLD AUTO: 0.6 K/UL — SIGNIFICANT CHANGE UP (ref 0–0.9)
MONOCYTES NFR BLD AUTO: 13.5 % — SIGNIFICANT CHANGE UP (ref 2–14)
NEUTROPHILS # BLD AUTO: 2.7 K/UL — SIGNIFICANT CHANGE UP (ref 1.8–7.4)
NEUTROPHILS NFR BLD AUTO: 57.8 % — SIGNIFICANT CHANGE UP (ref 43–77)
PLATELET # BLD AUTO: 178 K/UL — SIGNIFICANT CHANGE UP (ref 150–400)
POTASSIUM SERPL-SCNC: 5.1 MMOL/L
PROT SERPL-MCNC: 7.4 G/DL
PSA SERPL-MCNC: 0.64 NG/ML
RBC # BLD: 4.06 M/UL — LOW (ref 4.2–5.8)
RBC # FLD: 14.6 % — HIGH (ref 10.3–14.5)
SODIUM SERPL-SCNC: 142 MMOL/L
WBC # BLD: 4.6 K/UL — SIGNIFICANT CHANGE UP (ref 3.8–10.5)
WBC # FLD AUTO: 4.6 K/UL — SIGNIFICANT CHANGE UP (ref 3.8–10.5)

## 2023-08-17 DIAGNOSIS — C61 MALIGNANT NEOPLASM OF PROSTATE: ICD-10-CM

## 2023-08-17 DIAGNOSIS — C79.51 SECONDARY MALIGNANT NEOPLASM OF BONE: ICD-10-CM

## 2023-09-13 ENCOUNTER — APPOINTMENT (OUTPATIENT)
Dept: HEMATOLOGY ONCOLOGY | Facility: CLINIC | Age: 88
End: 2023-09-13
Payer: MEDICARE

## 2023-09-13 VITALS
HEART RATE: 66 BPM | WEIGHT: 162.48 LBS | OXYGEN SATURATION: 96 % | DIASTOLIC BLOOD PRESSURE: 70 MMHG | TEMPERATURE: 98 F | SYSTOLIC BLOOD PRESSURE: 131 MMHG | HEIGHT: 71.5 IN | BODY MASS INDEX: 22.25 KG/M2

## 2023-09-13 PROCEDURE — 99213 OFFICE O/P EST LOW 20 MIN: CPT

## 2023-11-08 ENCOUNTER — OUTPATIENT (OUTPATIENT)
Dept: OUTPATIENT SERVICES | Facility: HOSPITAL | Age: 88
LOS: 1 days | Discharge: HOME | End: 2023-11-08

## 2023-11-08 DIAGNOSIS — C61 MALIGNANT NEOPLASM OF PROSTATE: ICD-10-CM

## 2023-11-08 DIAGNOSIS — C79.51 SECONDARY MALIGNANT NEOPLASM OF BONE: ICD-10-CM

## 2023-11-13 ENCOUNTER — APPOINTMENT (OUTPATIENT)
Dept: HEMATOLOGY ONCOLOGY | Facility: CLINIC | Age: 88
End: 2023-11-13

## 2023-11-13 ENCOUNTER — RESULT REVIEW (OUTPATIENT)
Age: 88
End: 2023-11-13

## 2023-11-13 LAB
BASOPHILS # BLD AUTO: 0.1 K/UL — SIGNIFICANT CHANGE UP (ref 0–0.2)
BASOPHILS # BLD AUTO: 0.1 K/UL — SIGNIFICANT CHANGE UP (ref 0–0.2)
BASOPHILS NFR BLD AUTO: 1.6 % — SIGNIFICANT CHANGE UP (ref 0–2)
BASOPHILS NFR BLD AUTO: 1.6 % — SIGNIFICANT CHANGE UP (ref 0–2)
EOSINOPHIL # BLD AUTO: 0.2 K/UL — SIGNIFICANT CHANGE UP (ref 0–0.5)
EOSINOPHIL # BLD AUTO: 0.2 K/UL — SIGNIFICANT CHANGE UP (ref 0–0.5)
EOSINOPHIL NFR BLD AUTO: 4.4 % — SIGNIFICANT CHANGE UP (ref 0–6)
EOSINOPHIL NFR BLD AUTO: 4.4 % — SIGNIFICANT CHANGE UP (ref 0–6)
HCT VFR BLD CALC: 39.9 % — SIGNIFICANT CHANGE UP (ref 39–50)
HCT VFR BLD CALC: 39.9 % — SIGNIFICANT CHANGE UP (ref 39–50)
HGB BLD-MCNC: 12.8 G/DL — LOW (ref 13–17)
HGB BLD-MCNC: 12.8 G/DL — LOW (ref 13–17)
LYMPHOCYTES # BLD AUTO: 1.4 K/UL — SIGNIFICANT CHANGE UP (ref 1–3.3)
LYMPHOCYTES # BLD AUTO: 1.4 K/UL — SIGNIFICANT CHANGE UP (ref 1–3.3)
LYMPHOCYTES # BLD AUTO: 28.5 % — SIGNIFICANT CHANGE UP (ref 13–44)
LYMPHOCYTES # BLD AUTO: 28.5 % — SIGNIFICANT CHANGE UP (ref 13–44)
MCHC RBC-ENTMCNC: 30.7 PG — SIGNIFICANT CHANGE UP (ref 27–34)
MCHC RBC-ENTMCNC: 30.7 PG — SIGNIFICANT CHANGE UP (ref 27–34)
MCHC RBC-ENTMCNC: 32.2 G/DL — SIGNIFICANT CHANGE UP (ref 32–36)
MCHC RBC-ENTMCNC: 32.2 G/DL — SIGNIFICANT CHANGE UP (ref 32–36)
MCV RBC AUTO: 95.3 FL — SIGNIFICANT CHANGE UP (ref 80–100)
MCV RBC AUTO: 95.3 FL — SIGNIFICANT CHANGE UP (ref 80–100)
MONOCYTES # BLD AUTO: 0.7 K/UL — SIGNIFICANT CHANGE UP (ref 0–0.9)
MONOCYTES # BLD AUTO: 0.7 K/UL — SIGNIFICANT CHANGE UP (ref 0–0.9)
MONOCYTES NFR BLD AUTO: 13.7 % — SIGNIFICANT CHANGE UP (ref 2–14)
MONOCYTES NFR BLD AUTO: 13.7 % — SIGNIFICANT CHANGE UP (ref 2–14)
NEUTROPHILS # BLD AUTO: 2.5 K/UL — SIGNIFICANT CHANGE UP (ref 1.8–7.4)
NEUTROPHILS # BLD AUTO: 2.5 K/UL — SIGNIFICANT CHANGE UP (ref 1.8–7.4)
NEUTROPHILS NFR BLD AUTO: 51.8 % — SIGNIFICANT CHANGE UP (ref 43–77)
NEUTROPHILS NFR BLD AUTO: 51.8 % — SIGNIFICANT CHANGE UP (ref 43–77)
PLATELET # BLD AUTO: 219 K/UL — SIGNIFICANT CHANGE UP (ref 150–400)
PLATELET # BLD AUTO: 219 K/UL — SIGNIFICANT CHANGE UP (ref 150–400)
RBC # BLD: 4.19 M/UL — LOW (ref 4.2–5.8)
RBC # BLD: 4.19 M/UL — LOW (ref 4.2–5.8)
RBC # FLD: 14.3 % — SIGNIFICANT CHANGE UP (ref 10.3–14.5)
RBC # FLD: 14.3 % — SIGNIFICANT CHANGE UP (ref 10.3–14.5)
WBC # BLD: 4.8 K/UL — SIGNIFICANT CHANGE UP (ref 3.8–10.5)
WBC # BLD: 4.8 K/UL — SIGNIFICANT CHANGE UP (ref 3.8–10.5)
WBC # FLD AUTO: 4.8 K/UL — SIGNIFICANT CHANGE UP (ref 3.8–10.5)
WBC # FLD AUTO: 4.8 K/UL — SIGNIFICANT CHANGE UP (ref 3.8–10.5)

## 2023-11-14 LAB
ALBUMIN SERPL ELPH-MCNC: 4.3 G/DL
ALP BLD-CCNC: 55 U/L
ALT SERPL-CCNC: 11 U/L
ANION GAP SERPL CALC-SCNC: 14 MMOL/L
AST SERPL-CCNC: 21 U/L
BILIRUB SERPL-MCNC: 0.6 MG/DL
BUN SERPL-MCNC: 37 MG/DL
CALCIUM SERPL-MCNC: 9.2 MG/DL
CHLORIDE SERPL-SCNC: 102 MMOL/L
CO2 SERPL-SCNC: 25 MMOL/L
CREAT SERPL-MCNC: 1.47 MG/DL
EGFR: 45 ML/MIN/1.73M2
GLUCOSE SERPL-MCNC: 81 MG/DL
POTASSIUM SERPL-SCNC: 5.3 MMOL/L
PROT SERPL-MCNC: 7.8 G/DL
PSA SERPL-MCNC: 0.45 NG/ML
SODIUM SERPL-SCNC: 141 MMOL/L

## 2023-11-15 ENCOUNTER — APPOINTMENT (OUTPATIENT)
Age: 88
End: 2023-11-15

## 2024-01-18 ENCOUNTER — OUTPATIENT (OUTPATIENT)
Dept: OUTPATIENT SERVICES | Facility: HOSPITAL | Age: 89
LOS: 1 days | Discharge: HOME | End: 2024-01-18

## 2024-01-18 DIAGNOSIS — C79.51 SECONDARY MALIGNANT NEOPLASM OF BONE: ICD-10-CM

## 2024-01-18 DIAGNOSIS — C61 MALIGNANT NEOPLASM OF PROSTATE: ICD-10-CM

## 2024-01-24 ENCOUNTER — RESULT REVIEW (OUTPATIENT)
Age: 89
End: 2024-01-24

## 2024-01-24 ENCOUNTER — APPOINTMENT (OUTPATIENT)
Dept: HEMATOLOGY ONCOLOGY | Facility: CLINIC | Age: 89
End: 2024-01-24
Payer: MEDICARE

## 2024-01-24 VITALS
BODY MASS INDEX: 22.89 KG/M2 | SYSTOLIC BLOOD PRESSURE: 111 MMHG | OXYGEN SATURATION: 97 % | DIASTOLIC BLOOD PRESSURE: 59 MMHG | HEIGHT: 71 IN | TEMPERATURE: 97.1 F | WEIGHT: 163.5 LBS | HEART RATE: 96 BPM

## 2024-01-24 LAB
ALBUMIN SERPL ELPH-MCNC: 4.1 G/DL
ALP BLD-CCNC: 71 U/L
ALT SERPL-CCNC: 15 U/L
ANION GAP SERPL CALC-SCNC: 12 MMOL/L
AST SERPL-CCNC: 23 U/L
BASOPHILS # BLD AUTO: 0.1 K/UL — SIGNIFICANT CHANGE UP (ref 0–0.2)
BASOPHILS NFR BLD AUTO: 1.6 % — SIGNIFICANT CHANGE UP (ref 0–2)
BILIRUB SERPL-MCNC: 0.6 MG/DL
BUN SERPL-MCNC: 37 MG/DL
CALCIUM SERPL-MCNC: 8.3 MG/DL
CHLORIDE SERPL-SCNC: 103 MMOL/L
CO2 SERPL-SCNC: 25 MMOL/L
CREAT SERPL-MCNC: 1.44 MG/DL
EGFR: 46 ML/MIN/1.73M2
EOSINOPHIL # BLD AUTO: 0.2 K/UL — SIGNIFICANT CHANGE UP (ref 0–0.5)
EOSINOPHIL NFR BLD AUTO: 4.6 % — SIGNIFICANT CHANGE UP (ref 0–6)
GLUCOSE SERPL-MCNC: 84 MG/DL
HCT VFR BLD CALC: 38.9 % — LOW (ref 39–50)
HGB BLD-MCNC: 12.2 G/DL — LOW (ref 13–17)
LYMPHOCYTES # BLD AUTO: 0.9 K/UL — LOW (ref 1–3.3)
LYMPHOCYTES # BLD AUTO: 25.7 % — SIGNIFICANT CHANGE UP (ref 13–44)
MCHC RBC-ENTMCNC: 31.3 G/DL — LOW (ref 32–36)
MCHC RBC-ENTMCNC: 31.4 PG — SIGNIFICANT CHANGE UP (ref 27–34)
MCV RBC AUTO: 100.3 FL — HIGH (ref 80–100)
MONOCYTES # BLD AUTO: 0.4 K/UL — SIGNIFICANT CHANGE UP (ref 0–0.9)
MONOCYTES NFR BLD AUTO: 11.8 % — SIGNIFICANT CHANGE UP (ref 2–14)
NEUTROPHILS # BLD AUTO: 2.1 K/UL — SIGNIFICANT CHANGE UP (ref 1.8–7.4)
NEUTROPHILS NFR BLD AUTO: 56.2 % — SIGNIFICANT CHANGE UP (ref 43–77)
PLATELET # BLD AUTO: 158 K/UL — SIGNIFICANT CHANGE UP (ref 150–400)
POTASSIUM SERPL-SCNC: 5 MMOL/L
PROT SERPL-MCNC: 6.8 G/DL
PSA SERPL-MCNC: 0.32 NG/ML
RBC # BLD: 3.88 M/UL — LOW (ref 4.2–5.8)
RBC # FLD: 13.4 % — SIGNIFICANT CHANGE UP (ref 10.3–14.5)
SODIUM SERPL-SCNC: 140 MMOL/L
WBC # BLD: 3.7 K/UL — LOW (ref 3.8–10.5)
WBC # FLD AUTO: 3.7 K/UL — LOW (ref 3.8–10.5)

## 2024-01-24 PROCEDURE — 99213 OFFICE O/P EST LOW 20 MIN: CPT

## 2024-01-24 NOTE — ASSESSMENT
[FreeTextEntry1] : Prostate cancer metastatic to bone, now on a regimen that includes Lupron, enzalutamide, and denosumab. Bicalutamide 50 mg was instituted the week prior to the initiation of Lupron. Therapy is being tolerated well. Stable PSA. Continue current regimen Most recent PSA 0.24 1/22/24?  - Pending Barium study for throat issues - Continue Lupron every 3 months, next due in 1 month - Continue Xgeva every 6 months  RTO in 3 months.

## 2024-01-24 NOTE — HISTORY OF PRESENT ILLNESS
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 He had a recurrence in 2007 that has been treated with intermittent hormonal therapy with Lupron. He has been off Lupron since the summer 2021. He has recently had a rise in PSA to a current level of 12.7. PSMA PET scan on May 2, 2023 showed numerous lytic bone metastases including a right T7 transverse process lytic lesion with soft tissue component and mild dorsal epidural extension and neuroforaminal involvement. There is also a lesion at C1, the right anterior iliac bone, left anterior iliac, and left anterolateral second rib. 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  He has been followed by cardiology for paroxysmal A-fib and has a history of prior ablation for atrial flutter, sick sinus syndrome with dual-chamber pacemaker insertion, and prior mitral valve repair and subsequent replacement in September 2021. He has chronic renal insufficiency, with current BUN of 48 and creatinine 1.94.   [de-identified] : Returns for follow up visit to prostate cancer surveillance   Reports food gets caught in his throat He reports irregular bowel movements Pending Barium test for throat issues  PSA 0.24 (1/22/24?)

## 2024-01-24 NOTE — ADDENDUM
[FreeTextEntry1] :  Documented by Kimberly Ford acting as scribe for Dr. Vora on  01/24/2024.    All Medical record entries made by the Scribe were at my, Dr. Vora's, direction and personally dictated by me on  01/24/2024. I have reviewed the chart and agree that the record accurately reflects my personal performance of the history, physical exam, assessment and plan. I have also personally directed, reviewed, and agreed with the discharge instructions.

## 2024-02-21 ENCOUNTER — APPOINTMENT (OUTPATIENT)
Age: 89
End: 2024-02-21

## 2024-02-27 ENCOUNTER — RX ONLY (RX ONLY)
Age: 89
End: 2024-02-27

## 2024-02-27 ENCOUNTER — OFFICE (OUTPATIENT)
Dept: URBAN - METROPOLITAN AREA CLINIC 104 | Facility: CLINIC | Age: 89
Setting detail: OPHTHALMOLOGY
End: 2024-02-27
Payer: MEDICARE

## 2024-02-27 DIAGNOSIS — C44.1191: ICD-10-CM

## 2024-02-27 PROCEDURE — 99213 OFFICE O/P EST LOW 20 MIN: CPT | Performed by: OPTOMETRIST

## 2024-02-27 ASSESSMENT — REFRACTION_AUTOREFRACTION
OD_CYLINDER: -1.00
OS_AXIS: 76
OS_SPHERE: +0.50
OD_AXIS: 108
OD_SPHERE: +0.50
OS_CYLINDER: -1.00

## 2024-02-27 ASSESSMENT — SPHEQUIV_DERIVED
OS_SPHEQUIV: 0
OD_SPHEQUIV: 0

## 2024-02-27 ASSESSMENT — CONFRONTATIONAL VISUAL FIELD TEST (CVF)
OD_FINDINGS: FULL
OS_FINDINGS: FULL

## 2024-02-27 ASSESSMENT — REFRACTION_CURRENTRX
OD_OVR_VA: 20/
OD_SPHERE: +1.75
OS_OVR_VA: 20/
OS_SPHERE: +1.75

## 2024-02-28 ENCOUNTER — RX ONLY (RX ONLY)
Age: 89
End: 2024-02-28

## 2024-02-28 ENCOUNTER — OFFICE (OUTPATIENT)
Dept: URBAN - METROPOLITAN AREA CLINIC 104 | Facility: CLINIC | Age: 89
Setting detail: OPHTHALMOLOGY
End: 2024-02-28
Payer: MEDICARE

## 2024-02-28 DIAGNOSIS — H02.824: ICD-10-CM

## 2024-02-28 PROBLEM — C44.1191 BASAL CELL CARCINOMA OF SKIN OF LEFT UPPER EYELID, INCLUDING CANTHUS: Status: ACTIVE | Noted: 2024-02-27

## 2024-02-28 PROCEDURE — 92285 EXTERNAL OCULAR PHOTOGRAPHY: CPT | Performed by: OPHTHALMOLOGY

## 2024-02-28 PROCEDURE — 11440 EXC FACE-MM B9+MARG 0.5 CM/<: CPT | Mod: LT | Performed by: OPHTHALMOLOGY

## 2024-02-28 ASSESSMENT — CONFRONTATIONAL VISUAL FIELD TEST (CVF)
OS_FINDINGS: FULL
OD_FINDINGS: FULL

## 2024-03-20 ENCOUNTER — OFFICE (OUTPATIENT)
Dept: URBAN - METROPOLITAN AREA CLINIC 104 | Facility: CLINIC | Age: 89
Setting detail: OPHTHALMOLOGY
End: 2024-03-20
Payer: MEDICARE

## 2024-03-20 ENCOUNTER — APPOINTMENT (OUTPATIENT)
Age: 89
End: 2024-03-20

## 2024-03-20 DIAGNOSIS — C44.1291: ICD-10-CM

## 2024-03-20 PROCEDURE — 99213 OFFICE O/P EST LOW 20 MIN: CPT | Performed by: OPHTHALMOLOGY

## 2024-05-15 ENCOUNTER — OUTPATIENT (OUTPATIENT)
Dept: OUTPATIENT SERVICES | Facility: HOSPITAL | Age: 89
LOS: 1 days | Discharge: HOME | End: 2024-05-15

## 2024-05-15 DIAGNOSIS — C79.51 SECONDARY MALIGNANT NEOPLASM OF BONE: ICD-10-CM

## 2024-05-15 DIAGNOSIS — C61 MALIGNANT NEOPLASM OF PROSTATE: ICD-10-CM

## 2024-05-21 ENCOUNTER — RESULT REVIEW (OUTPATIENT)
Age: 89
End: 2024-05-21

## 2024-05-21 ENCOUNTER — APPOINTMENT (OUTPATIENT)
Dept: HEMATOLOGY ONCOLOGY | Facility: CLINIC | Age: 89
End: 2024-05-21

## 2024-05-21 LAB
BASOPHILS # BLD AUTO: 0.1 K/UL — SIGNIFICANT CHANGE UP (ref 0–0.2)
BASOPHILS NFR BLD AUTO: 2.1 % — HIGH (ref 0–2)
EOSINOPHIL # BLD AUTO: 0.3 K/UL — SIGNIFICANT CHANGE UP (ref 0–0.5)
EOSINOPHIL NFR BLD AUTO: 6.2 % — HIGH (ref 0–6)
HCT VFR BLD CALC: 37.3 % — LOW (ref 39–50)
HGB BLD-MCNC: 12 G/DL — LOW (ref 13–17)
LYMPHOCYTES # BLD AUTO: 1.1 K/UL — SIGNIFICANT CHANGE UP (ref 1–3.3)
LYMPHOCYTES # BLD AUTO: 26.2 % — SIGNIFICANT CHANGE UP (ref 13–44)
MCHC RBC-ENTMCNC: 30.1 PG — SIGNIFICANT CHANGE UP (ref 27–34)
MCHC RBC-ENTMCNC: 32.2 G/DL — SIGNIFICANT CHANGE UP (ref 32–36)
MCV RBC AUTO: 93.7 FL — SIGNIFICANT CHANGE UP (ref 80–100)
MONOCYTES # BLD AUTO: 0.6 K/UL — SIGNIFICANT CHANGE UP (ref 0–0.9)
MONOCYTES NFR BLD AUTO: 13.2 % — SIGNIFICANT CHANGE UP (ref 2–14)
NEUTROPHILS # BLD AUTO: 2.2 K/UL — SIGNIFICANT CHANGE UP (ref 1.8–7.4)
NEUTROPHILS NFR BLD AUTO: 52.3 % — SIGNIFICANT CHANGE UP (ref 43–77)
PLATELET # BLD AUTO: 190 K/UL — SIGNIFICANT CHANGE UP (ref 150–400)
RBC # BLD: 3.98 M/UL — LOW (ref 4.2–5.8)
RBC # FLD: 14.7 % — HIGH (ref 10.3–14.5)
WBC # BLD: 4.2 K/UL — SIGNIFICANT CHANGE UP (ref 3.8–10.5)
WBC # FLD AUTO: 4.2 K/UL — SIGNIFICANT CHANGE UP (ref 3.8–10.5)

## 2024-05-22 ENCOUNTER — APPOINTMENT (OUTPATIENT)
Age: 89
End: 2024-05-22

## 2024-05-22 ENCOUNTER — APPOINTMENT (OUTPATIENT)
Dept: HEMATOLOGY ONCOLOGY | Facility: CLINIC | Age: 89
End: 2024-05-22
Payer: MEDICARE

## 2024-05-22 VITALS
HEART RATE: 84 BPM | SYSTOLIC BLOOD PRESSURE: 128 MMHG | DIASTOLIC BLOOD PRESSURE: 66 MMHG | HEIGHT: 71 IN | WEIGHT: 153.5 LBS | TEMPERATURE: 97.2 F | OXYGEN SATURATION: 96 % | BODY MASS INDEX: 21.49 KG/M2

## 2024-05-22 DIAGNOSIS — C61 MALIGNANT NEOPLASM OF PROSTATE: ICD-10-CM

## 2024-05-22 LAB
ALBUMIN SERPL ELPH-MCNC: 4.5 G/DL
ALP BLD-CCNC: 74 U/L
ALT SERPL-CCNC: 11 U/L
ANION GAP SERPL CALC-SCNC: 13 MMOL/L
AST SERPL-CCNC: 22 U/L
BILIRUB SERPL-MCNC: 0.8 MG/DL
BUN SERPL-MCNC: 35 MG/DL
CALCIUM SERPL-MCNC: 9.1 MG/DL
CHLORIDE SERPL-SCNC: 103 MMOL/L
CO2 SERPL-SCNC: 26 MMOL/L
CREAT SERPL-MCNC: 1.35 MG/DL
EGFR: 50 ML/MIN/1.73M2
GLUCOSE SERPL-MCNC: 84 MG/DL
POTASSIUM SERPL-SCNC: 5.2 MMOL/L
PROT SERPL-MCNC: 7.1 G/DL
PSA SERPL-MCNC: 0.34 NG/ML
SODIUM SERPL-SCNC: 141 MMOL/L

## 2024-05-22 PROCEDURE — 99214 OFFICE O/P EST MOD 30 MIN: CPT

## 2024-05-22 NOTE — REVIEW OF SYSTEMS
[Diarrhea: Grade 0] : Diarrhea: Grade 0 [Joint Pain] : joint pain [Negative] : Allergic/Immunologic [FreeTextEntry7] : swallowing difficulty - ENT exam was WNL, now for barium swallow

## 2024-05-22 NOTE — ADDENDUM
[FreeTextEntry1] : Documented by Anders Nice acting as scribe for Dr. Vora on  05/22/2024.   All Medical record entries made by the Scribe were at my, Dr. Vora's, direction and personally dictated by me on  05/22/2024. I have reviewed the chart and agree that the record accurately reflects my personal performance of the history, physical exam, assessment and plan. I have also personally directed, reviewed, and agreed with the discharge instructions.

## 2024-05-22 NOTE — HISTORY OF PRESENT ILLNESS
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 He had a recurrence in 2007 that has been treated with intermittent hormonal therapy with Lupron. He has been off Lupron since the summer 2021. He has recently had a rise in PSA to a current level of 12.7. PSMA PET scan on May 2, 2023 showed numerous lytic bone metastases including a right T7 transverse process lytic lesion with soft tissue component and mild dorsal epidural extension and neuroforaminal involvement. There is also a lesion at C1, the right anterior iliac bone, left anterior iliac, and left anterolateral second rib. 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  He has been followed by cardiology for paroxysmal A-fib and has a history of prior ablation for atrial flutter, sick sinus syndrome with dual-chamber pacemaker insertion, and prior mitral valve repair and subsequent replacement in September 2021. He has chronic renal insufficiency, with current BUN of 48 and creatinine 1.94.   [de-identified] : Returns for follow up visit to prostate cancer surveillance  Tolerating medications well. He continues to experience throat changes with mucus production. Normal Barium testing. Food tastes much different now otherwise he denies pain.  PSA 0.34 (5/22/24)

## 2024-06-21 RX ORDER — ENZALUTAMIDE 40 MG/1
40 CAPSULE ORAL DAILY
Qty: 120 | Refills: 6 | Status: ACTIVE | COMMUNITY
Start: 2023-05-08 | End: 1900-01-01

## 2024-08-13 ENCOUNTER — OUTPATIENT (OUTPATIENT)
Dept: OUTPATIENT SERVICES | Facility: HOSPITAL | Age: 89
LOS: 1 days | Discharge: HOME | End: 2024-08-13

## 2024-08-13 DIAGNOSIS — C61 MALIGNANT NEOPLASM OF PROSTATE: ICD-10-CM

## 2024-08-13 DIAGNOSIS — C79.51 SECONDARY MALIGNANT NEOPLASM OF BONE: ICD-10-CM

## 2024-08-21 ENCOUNTER — RESULT REVIEW (OUTPATIENT)
Age: 89
End: 2024-08-21

## 2024-08-21 ENCOUNTER — APPOINTMENT (OUTPATIENT)
Age: 89
End: 2024-08-21

## 2024-08-21 ENCOUNTER — APPOINTMENT (OUTPATIENT)
Dept: HEMATOLOGY ONCOLOGY | Facility: CLINIC | Age: 89
End: 2024-08-21
Payer: MEDICARE

## 2024-08-21 VITALS
SYSTOLIC BLOOD PRESSURE: 122 MMHG | HEIGHT: 73 IN | DIASTOLIC BLOOD PRESSURE: 64 MMHG | WEIGHT: 150 LBS | HEART RATE: 91 BPM | RESPIRATION RATE: 16 BRPM | TEMPERATURE: 97 F | OXYGEN SATURATION: 97 % | BODY MASS INDEX: 19.88 KG/M2

## 2024-08-21 DIAGNOSIS — C61 MALIGNANT NEOPLASM OF PROSTATE: ICD-10-CM

## 2024-08-21 LAB
ALBUMIN SERPL ELPH-MCNC: 4.5 G/DL
ALP BLD-CCNC: 60 U/L
ALT SERPL-CCNC: 12 U/L
ANION GAP SERPL CALC-SCNC: 14 MMOL/L
AST SERPL-CCNC: 22 U/L
BASOPHILS # BLD AUTO: 0.1 K/UL — SIGNIFICANT CHANGE UP (ref 0–0.2)
BASOPHILS NFR BLD AUTO: 1.6 % — SIGNIFICANT CHANGE UP (ref 0–2)
BILIRUB SERPL-MCNC: 0.7 MG/DL
BUN SERPL-MCNC: 36 MG/DL
CALCIUM SERPL-MCNC: 8.9 MG/DL
CHLORIDE SERPL-SCNC: 105 MMOL/L
CO2 SERPL-SCNC: 23 MMOL/L
CREAT SERPL-MCNC: 1.37 MG/DL
EGFR: 49 ML/MIN/1.73M2
EOSINOPHIL # BLD AUTO: 0.2 K/UL — SIGNIFICANT CHANGE UP (ref 0–0.5)
EOSINOPHIL NFR BLD AUTO: 3.6 % — SIGNIFICANT CHANGE UP (ref 0–6)
GLUCOSE SERPL-MCNC: 101 MG/DL
HCT VFR BLD CALC: 37 % — LOW (ref 39–50)
HGB BLD-MCNC: 12.2 G/DL — LOW (ref 13–17)
LYMPHOCYTES # BLD AUTO: 1 K/UL — SIGNIFICANT CHANGE UP (ref 1–3.3)
LYMPHOCYTES # BLD AUTO: 24.2 % — SIGNIFICANT CHANGE UP (ref 13–44)
MCHC RBC-ENTMCNC: 32.4 PG — SIGNIFICANT CHANGE UP (ref 27–34)
MCHC RBC-ENTMCNC: 32.9 G/DL — SIGNIFICANT CHANGE UP (ref 32–36)
MCV RBC AUTO: 98.3 FL — SIGNIFICANT CHANGE UP (ref 80–100)
MONOCYTES # BLD AUTO: 0.5 K/UL — SIGNIFICANT CHANGE UP (ref 0–0.9)
MONOCYTES NFR BLD AUTO: 10.7 % — SIGNIFICANT CHANGE UP (ref 2–14)
NEUTROPHILS # BLD AUTO: 2.5 K/UL — SIGNIFICANT CHANGE UP (ref 1.8–7.4)
NEUTROPHILS NFR BLD AUTO: 59.9 % — SIGNIFICANT CHANGE UP (ref 43–77)
PLATELET # BLD AUTO: 159 K/UL — SIGNIFICANT CHANGE UP (ref 150–400)
POTASSIUM SERPL-SCNC: 5.2 MMOL/L
PROT SERPL-MCNC: 7.3 G/DL
PSA SERPL-MCNC: 0.37 NG/ML
RBC # BLD: 3.76 M/UL — LOW (ref 4.2–5.8)
RBC # FLD: 13 % — SIGNIFICANT CHANGE UP (ref 10.3–14.5)
SODIUM SERPL-SCNC: 142 MMOL/L
TSH SERPL-ACNC: 3.33 UIU/ML
WBC # BLD: 4.2 K/UL — SIGNIFICANT CHANGE UP (ref 3.8–10.5)
WBC # FLD AUTO: 4.2 K/UL — SIGNIFICANT CHANGE UP (ref 3.8–10.5)

## 2024-08-21 PROCEDURE — 99214 OFFICE O/P EST MOD 30 MIN: CPT

## 2024-08-22 DIAGNOSIS — Z51.11 ENCOUNTER FOR ANTINEOPLASTIC CHEMOTHERAPY: ICD-10-CM

## 2024-08-22 LAB
TESTOST FREE SERPL-MCNC: 0.2 PG/ML
TESTOST SERPL-MCNC: <2.5 NG/DL

## 2024-08-22 NOTE — ASSESSMENT
[FreeTextEntry1] : Prostate cancer metastatic to bone, now on a regimen that includes Lupron, enzalutamide, and denosumab. Bicalutamide 50 mg was instituted the week prior to the initiation of Lupron. Therapy is being tolerated well. Current PSA. 0.37 Continue current regimen  - Continue Lupron every 3 months, next due today, 8/21/24.  - Continue Xgeva every 6 months  RTO in 3 months.

## 2024-08-22 NOTE — HISTORY OF PRESENT ILLNESS
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 He had a recurrence in 2007 that has been treated with intermittent hormonal therapy with Lupron. He has been off Lupron since the summer 2021. He has recently had a rise in PSA to a current level of 12.7. PSMA PET scan on May 2, 2023 showed numerous lytic bone metastases including a right T7 transverse process lytic lesion with soft tissue component and mild dorsal epidural extension and neuroforaminal involvement. There is also a lesion at C1, the right anterior iliac bone, left anterior iliac, and left anterolateral second rib. 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  He has been followed by cardiology for paroxysmal A-fib and has a history of prior ablation for atrial flutter, sick sinus syndrome with dual-chamber pacemaker insertion, and prior mitral valve repair and subsequent replacement in September 2021. He has chronic renal insufficiency, with current BUN of 48 and creatinine 1.94.   [de-identified] : Returns for follow up visit to prostate cancer surveillance. Tolerating medications well. He continues to experience throat changes with mucus production affecting his right ear. Normal Barium testing.  Despite the throat changes, he denies any pain.   PSA 0.34 (5/22/24), 0.37 today (7/21/24)

## 2024-08-22 NOTE — REVIEW OF SYSTEMS
[Diarrhea: Grade 0] : Diarrhea: Grade 0 [Joint Pain] : joint pain [Negative] : Allergic/Immunologic [FreeTextEntry7] : swallowing difficulty - ENT exam was WNL, barium swallow showed no significant abnormality

## 2024-08-22 NOTE — HISTORY OF PRESENT ILLNESS
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 He had a recurrence in 2007 that has been treated with intermittent hormonal therapy with Lupron. He has been off Lupron since the summer 2021. He has recently had a rise in PSA to a current level of 12.7. PSMA PET scan on May 2, 2023 showed numerous lytic bone metastases including a right T7 transverse process lytic lesion with soft tissue component and mild dorsal epidural extension and neuroforaminal involvement. There is also a lesion at C1, the right anterior iliac bone, left anterior iliac, and left anterolateral second rib. 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  He has been followed by cardiology for paroxysmal A-fib and has a history of prior ablation for atrial flutter, sick sinus syndrome with dual-chamber pacemaker insertion, and prior mitral valve repair and subsequent replacement in September 2021. He has chronic renal insufficiency, with current BUN of 48 and creatinine 1.94.   [de-identified] : Returns for follow up visit to prostate cancer surveillance. Tolerating medications well. He continues to experience throat changes with mucus production affecting his right ear. Normal Barium testing.  Despite the throat changes, he denies any pain.   PSA 0.34 (5/22/24), 0.37 today (7/21/24)

## 2024-08-22 NOTE — PHYSICAL EXAM
Patient called stating that she needs a refill for her Zolpidem 10mg (Insomnia) 1 at bedtime w/60 qu  Please call patient @ [phone] when Rx is ready   [Normal] : grossly intact [de-identified] : Arthritic shoulders right greater than left

## 2024-08-22 NOTE — ADDENDUM
[FreeTextEntry1] : Documented by Anders Nice acting as scribe for Dr. Vora on  08/21/2024.   All Medical record entries made by the Scribe were at my, Dr. Vora's, direction and personally dictated by me on  08/21/2024. I have reviewed the chart and agree that the record accurately reflects my personal performance of the history, physical exam, assessment and plan. I have also personally directed, reviewed, and agreed with the discharge instructions.

## 2024-10-09 ENCOUNTER — OFFICE (OUTPATIENT)
Dept: URBAN - METROPOLITAN AREA CLINIC 104 | Facility: CLINIC | Age: 89
Setting detail: OPHTHALMOLOGY
End: 2024-10-09
Payer: MEDICARE

## 2024-10-09 DIAGNOSIS — C44.1291: ICD-10-CM

## 2024-10-09 PROCEDURE — 99213 OFFICE O/P EST LOW 20 MIN: CPT | Performed by: OPHTHALMOLOGY

## 2024-10-09 ASSESSMENT — CONFRONTATIONAL VISUAL FIELD TEST (CVF)
OS_FINDINGS: FULL
OD_FINDINGS: FULL

## 2024-10-09 ASSESSMENT — VISUAL ACUITY
OS_BCVA: 20/25
OD_BCVA: 20/40

## 2024-10-16 ENCOUNTER — OFFICE (OUTPATIENT)
Dept: URBAN - METROPOLITAN AREA CLINIC 104 | Facility: CLINIC | Age: 89
Setting detail: OPHTHALMOLOGY
End: 2024-10-16
Payer: MEDICARE

## 2024-10-16 DIAGNOSIS — H26.492: ICD-10-CM

## 2024-10-16 DIAGNOSIS — H01.005: ICD-10-CM

## 2024-10-16 DIAGNOSIS — H01.002: ICD-10-CM

## 2024-10-16 DIAGNOSIS — Z96.1: ICD-10-CM

## 2024-10-16 DIAGNOSIS — H01.004: ICD-10-CM

## 2024-10-16 DIAGNOSIS — H01.001: ICD-10-CM

## 2024-10-16 DIAGNOSIS — H43.813: ICD-10-CM

## 2024-10-16 PROCEDURE — 92014 COMPRE OPH EXAM EST PT 1/>: CPT | Performed by: SPECIALIST

## 2024-10-16 ASSESSMENT — VISUAL ACUITY
OS_BCVA: 20/25-
OD_BCVA: 20/30

## 2024-10-16 ASSESSMENT — CONFRONTATIONAL VISUAL FIELD TEST (CVF)
OD_FINDINGS: FULL
OS_FINDINGS: FULL

## 2024-10-16 ASSESSMENT — REFRACTION_AUTOREFRACTION
OD_CYLINDER: -1.25
OS_AXIS: 73
OD_AXIS: 101
OD_SPHERE: +1.25
OS_CYLINDER: -2.00
OS_SPHERE: +1.25

## 2024-10-16 ASSESSMENT — LID EXAM ASSESSMENTS
OD_BLEPHARITIS: RLL RUL 1+
OS_BLEPHARITIS: LLL LUL 1+

## 2024-10-16 ASSESSMENT — TONOMETRY
OD_IOP_MMHG: 14
OS_IOP_MMHG: 14

## 2024-11-13 ENCOUNTER — OUTPATIENT (OUTPATIENT)
Dept: OUTPATIENT SERVICES | Facility: HOSPITAL | Age: 88
LOS: 1 days | Discharge: HOME | End: 2024-11-13

## 2024-11-13 DIAGNOSIS — C61 MALIGNANT NEOPLASM OF PROSTATE: ICD-10-CM

## 2024-11-13 DIAGNOSIS — C79.51 SECONDARY MALIGNANT NEOPLASM OF BONE: ICD-10-CM

## 2024-11-20 ENCOUNTER — APPOINTMENT (OUTPATIENT)
Dept: HEMATOLOGY ONCOLOGY | Facility: CLINIC | Age: 89
End: 2024-11-20
Payer: MEDICARE

## 2024-11-20 ENCOUNTER — RESULT REVIEW (OUTPATIENT)
Age: 89
End: 2024-11-20

## 2024-11-20 ENCOUNTER — APPOINTMENT (OUTPATIENT)
Age: 89
End: 2024-11-20

## 2024-11-20 ENCOUNTER — NON-APPOINTMENT (OUTPATIENT)
Age: 89
End: 2024-11-20

## 2024-11-20 VITALS
BODY MASS INDEX: 29.64 KG/M2 | WEIGHT: 156.97 LBS | TEMPERATURE: 97 F | HEART RATE: 68 BPM | OXYGEN SATURATION: 93 % | DIASTOLIC BLOOD PRESSURE: 65 MMHG | SYSTOLIC BLOOD PRESSURE: 121 MMHG | HEIGHT: 61 IN

## 2024-11-20 DIAGNOSIS — C61 MALIGNANT NEOPLASM OF PROSTATE: ICD-10-CM

## 2024-11-20 LAB
BASOPHILS # BLD AUTO: 0.1 K/UL — SIGNIFICANT CHANGE UP (ref 0–0.2)
BASOPHILS NFR BLD AUTO: 2.2 % — HIGH (ref 0–2)
EOSINOPHIL # BLD AUTO: 0.2 K/UL — SIGNIFICANT CHANGE UP (ref 0–0.5)
EOSINOPHIL NFR BLD AUTO: 4 % — SIGNIFICANT CHANGE UP (ref 0–6)
HCT VFR BLD CALC: 36.6 % — LOW (ref 39–50)
HGB BLD-MCNC: 11.8 G/DL — LOW (ref 13–17)
LYMPHOCYTES # BLD AUTO: 1.1 K/UL — SIGNIFICANT CHANGE UP (ref 1–3.3)
LYMPHOCYTES # BLD AUTO: 24.9 % — SIGNIFICANT CHANGE UP (ref 13–44)
MCHC RBC-ENTMCNC: 31.7 PG — SIGNIFICANT CHANGE UP (ref 27–34)
MCHC RBC-ENTMCNC: 32.3 G/DL — SIGNIFICANT CHANGE UP (ref 32–36)
MCV RBC AUTO: 98.2 FL — SIGNIFICANT CHANGE UP (ref 80–100)
MONOCYTES # BLD AUTO: 0.5 K/UL — SIGNIFICANT CHANGE UP (ref 0–0.9)
MONOCYTES NFR BLD AUTO: 10.7 % — SIGNIFICANT CHANGE UP (ref 2–14)
NEUTROPHILS # BLD AUTO: 2.6 K/UL — SIGNIFICANT CHANGE UP (ref 1.8–7.4)
NEUTROPHILS NFR BLD AUTO: 58.2 % — SIGNIFICANT CHANGE UP (ref 43–77)
PLATELET # BLD AUTO: 182 K/UL — SIGNIFICANT CHANGE UP (ref 150–400)
RBC # BLD: 3.72 M/UL — LOW (ref 4.2–5.8)
RBC # FLD: 13.8 % — SIGNIFICANT CHANGE UP (ref 10.3–14.5)
WBC # BLD: 4.5 K/UL — SIGNIFICANT CHANGE UP (ref 3.8–10.5)
WBC # FLD AUTO: 4.5 K/UL — SIGNIFICANT CHANGE UP (ref 3.8–10.5)

## 2024-11-20 PROCEDURE — 99214 OFFICE O/P EST MOD 30 MIN: CPT

## 2024-12-17 ENCOUNTER — OFFICE (OUTPATIENT)
Dept: URBAN - METROPOLITAN AREA CLINIC 104 | Facility: CLINIC | Age: 89
Setting detail: OPHTHALMOLOGY
End: 2024-12-17
Payer: MEDICARE

## 2024-12-17 DIAGNOSIS — H26.492: ICD-10-CM

## 2024-12-17 DIAGNOSIS — Z96.1: ICD-10-CM

## 2024-12-17 DIAGNOSIS — H01.002: ICD-10-CM

## 2024-12-17 DIAGNOSIS — H01.004: ICD-10-CM

## 2024-12-17 DIAGNOSIS — H01.005: ICD-10-CM

## 2024-12-17 DIAGNOSIS — H01.001: ICD-10-CM

## 2024-12-17 DIAGNOSIS — G45.3: ICD-10-CM

## 2024-12-17 DIAGNOSIS — H43.813: ICD-10-CM

## 2024-12-17 PROCEDURE — 92014 COMPRE OPH EXAM EST PT 1/>: CPT | Performed by: SPECIALIST

## 2024-12-17 PROCEDURE — 92250 FUNDUS PHOTOGRAPHY W/I&R: CPT | Performed by: SPECIALIST

## 2024-12-17 ASSESSMENT — VISUAL ACUITY
OS_BCVA: 20/25-1
OD_BCVA: 20/30-2

## 2024-12-17 ASSESSMENT — REFRACTION_AUTOREFRACTION
OS_CYLINDER: -2.00
OD_AXIS: 101
OS_AXIS: 73
OD_SPHERE: +1.25
OD_CYLINDER: -1.25
OS_SPHERE: +1.25

## 2024-12-17 ASSESSMENT — LID EXAM ASSESSMENTS
OS_BLEPHARITIS: LLL LUL 1+
OD_BLEPHARITIS: RLL RUL 1+

## 2024-12-17 ASSESSMENT — TONOMETRY
OD_IOP_MMHG: 10
OS_IOP_MMHG: 10

## 2024-12-17 ASSESSMENT — CONFRONTATIONAL VISUAL FIELD TEST (CVF)
OS_FINDINGS: FULL
OD_FINDINGS: FULL

## 2025-02-18 ENCOUNTER — OUTPATIENT (OUTPATIENT)
Dept: OUTPATIENT SERVICES | Facility: HOSPITAL | Age: 89
LOS: 1 days | Discharge: HOME | End: 2025-02-18

## 2025-02-18 DIAGNOSIS — C61 MALIGNANT NEOPLASM OF PROSTATE: ICD-10-CM

## 2025-02-19 ENCOUNTER — APPOINTMENT (OUTPATIENT)
Dept: HEMATOLOGY ONCOLOGY | Facility: CLINIC | Age: 89
End: 2025-02-19
Payer: MEDICARE

## 2025-02-19 ENCOUNTER — APPOINTMENT (OUTPATIENT)
Age: 89
End: 2025-02-19

## 2025-02-19 ENCOUNTER — RESULT REVIEW (OUTPATIENT)
Age: 89
End: 2025-02-19

## 2025-02-19 VITALS
HEART RATE: 80 BPM | OXYGEN SATURATION: 99 % | DIASTOLIC BLOOD PRESSURE: 73 MMHG | WEIGHT: 158.29 LBS | TEMPERATURE: 97.2 F | BODY MASS INDEX: 29.89 KG/M2 | SYSTOLIC BLOOD PRESSURE: 134 MMHG | HEIGHT: 61 IN

## 2025-02-19 DIAGNOSIS — C61 MALIGNANT NEOPLASM OF PROSTATE: ICD-10-CM

## 2025-02-19 LAB
BASOPHILS # BLD AUTO: 0.06 K/UL — SIGNIFICANT CHANGE UP (ref 0–0.2)
BASOPHILS NFR BLD AUTO: 1.5 % — SIGNIFICANT CHANGE UP (ref 0–2)
EOSINOPHIL # BLD AUTO: 0.2 K/UL — SIGNIFICANT CHANGE UP (ref 0–0.5)
EOSINOPHIL NFR BLD AUTO: 4.9 % — SIGNIFICANT CHANGE UP (ref 0–6)
HCT VFR BLD CALC: 37.3 % — LOW (ref 39–50)
HGB BLD-MCNC: 12 G/DL — LOW (ref 13–17)
IMM GRANULOCYTES # BLD AUTO: 0.02 K/UL — SIGNIFICANT CHANGE UP (ref 0–0.07)
IMM GRANULOCYTES NFR BLD AUTO: 0.5 % — SIGNIFICANT CHANGE UP (ref 0–0.9)
LYMPHOCYTES # BLD AUTO: 0.91 K/UL — LOW (ref 1–3.3)
LYMPHOCYTES NFR BLD AUTO: 22.2 % — SIGNIFICANT CHANGE UP (ref 13–44)
MCHC RBC-ENTMCNC: 31.2 PG — SIGNIFICANT CHANGE UP (ref 27–34)
MCHC RBC-ENTMCNC: 32.2 G/DL — SIGNIFICANT CHANGE UP (ref 32–36)
MCV RBC AUTO: 96.9 FL — SIGNIFICANT CHANGE UP (ref 80–100)
MONOCYTES # BLD AUTO: 0.56 K/UL — SIGNIFICANT CHANGE UP (ref 0–0.9)
MONOCYTES NFR BLD AUTO: 13.7 % — SIGNIFICANT CHANGE UP (ref 2–14)
NEUTROPHILS # BLD AUTO: 2.35 K/UL — SIGNIFICANT CHANGE UP (ref 1.8–7.4)
NEUTROPHILS NFR BLD AUTO: 57.2 % — SIGNIFICANT CHANGE UP (ref 43–77)
NRBC # BLD AUTO: 0 K/UL — SIGNIFICANT CHANGE UP (ref 0–0)
NRBC # FLD: 0 K/UL — SIGNIFICANT CHANGE UP (ref 0–0)
NRBC BLD AUTO-RTO: 0 /100 WBCS — SIGNIFICANT CHANGE UP (ref 0–0)
PLATELET # BLD AUTO: 155 K/UL — SIGNIFICANT CHANGE UP (ref 150–400)
PMV BLD: 8.7 FL — SIGNIFICANT CHANGE UP (ref 7–13)
RBC # BLD: 3.85 M/UL — LOW (ref 4.2–5.8)
RBC # FLD: 14.1 % — SIGNIFICANT CHANGE UP (ref 10.3–14.5)
WBC # BLD: 4.1 K/UL — SIGNIFICANT CHANGE UP (ref 3.8–10.5)
WBC # FLD AUTO: 4.1 K/UL — SIGNIFICANT CHANGE UP (ref 3.8–10.5)

## 2025-02-19 PROCEDURE — 99214 OFFICE O/P EST MOD 30 MIN: CPT

## 2025-02-19 RX ORDER — PREDNISONE 5 MG/1
5 TABLET ORAL DAILY
Qty: 1 | Refills: 3 | Status: ACTIVE | COMMUNITY
Start: 2025-02-19 | End: 1900-01-01

## 2025-02-19 RX ORDER — ABIRATERONE ACETATE 250 MG/1
250 TABLET, FILM COATED ORAL
Qty: 120 | Refills: 11 | Status: ACTIVE | COMMUNITY
Start: 2025-02-19 | End: 1900-01-01

## 2025-02-20 LAB
ALBUMIN SERPL ELPH-MCNC: 4.4 G/DL
ALP BLD-CCNC: 71 U/L
ALT SERPL-CCNC: 12 U/L
ANION GAP SERPL CALC-SCNC: 10 MMOL/L
AST SERPL-CCNC: 25 U/L
BILIRUB SERPL-MCNC: 0.7 MG/DL
BUN SERPL-MCNC: 31 MG/DL
CALCIUM SERPL-MCNC: 9 MG/DL
CHLORIDE SERPL-SCNC: 104 MMOL/L
CO2 SERPL-SCNC: 26 MMOL/L
CREAT SERPL-MCNC: 1.32 MG/DL
EGFR: 51 ML/MIN/1.73M2
GLUCOSE SERPL-MCNC: 92 MG/DL
POTASSIUM SERPL-SCNC: 5.6 MMOL/L
PROT SERPL-MCNC: 7.4 G/DL
PSA SERPL-MCNC: 0.78 NG/ML
SODIUM SERPL-SCNC: 140 MMOL/L

## 2025-03-18 ENCOUNTER — OFFICE (OUTPATIENT)
Dept: URBAN - METROPOLITAN AREA CLINIC 104 | Facility: CLINIC | Age: OVER 89
Setting detail: OPHTHALMOLOGY
End: 2025-03-18
Payer: MEDICARE

## 2025-03-18 ENCOUNTER — RX ONLY (RX ONLY)
Age: OVER 89
End: 2025-03-18

## 2025-03-18 DIAGNOSIS — H01.004: ICD-10-CM

## 2025-03-18 DIAGNOSIS — H43.813: ICD-10-CM

## 2025-03-18 DIAGNOSIS — H26.492: ICD-10-CM

## 2025-03-18 DIAGNOSIS — H01.002: ICD-10-CM

## 2025-03-18 DIAGNOSIS — H01.001: ICD-10-CM

## 2025-03-18 PROBLEM — H43.392 VITREOUS FLOATERS; LEFT EYE: Status: ACTIVE | Noted: 2025-03-18

## 2025-03-18 PROCEDURE — 99213 OFFICE O/P EST LOW 20 MIN: CPT | Mod: 57 | Performed by: SPECIALIST

## 2025-03-18 PROCEDURE — 66821 AFTER CATARACT LASER SURGERY: CPT | Mod: LT | Performed by: SPECIALIST

## 2025-03-18 ASSESSMENT — REFRACTION_AUTOREFRACTION
OS_SPHERE: +1.50
OD_CYLINDER: -1.25
OS_CYLINDER: -2.50
OD_SPHERE: +0.25
OS_AXIS: 063
OD_AXIS: 121

## 2025-03-18 ASSESSMENT — REFRACTION_MANIFEST
OS_CYLINDER: -2.50
OD_CYLINDER: -1.25
OS_SPHERE: +1.50
OD_AXIS: 121
OD_SPHERE: +0.25
OS_VA1: 20/50
OS_AXIS: 065

## 2025-03-18 ASSESSMENT — VISUAL ACUITY
OD_BCVA: 20/50
OS_BCVA: 20/30-2

## 2025-03-18 ASSESSMENT — CONFRONTATIONAL VISUAL FIELD TEST (CVF)
OS_FINDINGS: FULL
OD_FINDINGS: FULL

## 2025-03-18 ASSESSMENT — LID EXAM ASSESSMENTS
OD_BLEPHARITIS: RLL RUL 1+
OS_BLEPHARITIS: LLL LUL 1+

## 2025-04-17 ENCOUNTER — OFFICE (OUTPATIENT)
Dept: URBAN - METROPOLITAN AREA CLINIC 104 | Facility: CLINIC | Age: OVER 89
Setting detail: OPHTHALMOLOGY
End: 2025-04-17
Payer: MEDICARE

## 2025-04-17 DIAGNOSIS — H43.392: ICD-10-CM

## 2025-04-17 DIAGNOSIS — H43.813: ICD-10-CM

## 2025-04-17 DIAGNOSIS — H01.004: ICD-10-CM

## 2025-04-17 DIAGNOSIS — H01.005: ICD-10-CM

## 2025-04-17 DIAGNOSIS — Z96.1: ICD-10-CM

## 2025-04-17 DIAGNOSIS — H26.492: ICD-10-CM

## 2025-04-17 DIAGNOSIS — H01.001: ICD-10-CM

## 2025-04-17 DIAGNOSIS — H01.002: ICD-10-CM

## 2025-04-17 PROCEDURE — 99024 POSTOP FOLLOW-UP VISIT: CPT | Performed by: SPECIALIST

## 2025-04-17 ASSESSMENT — REFRACTION_AUTOREFRACTION
OD_CYLINDER: -1.25
OS_AXIS: 063
OS_SPHERE: +1.50
OD_AXIS: 121
OD_SPHERE: +0.25
OS_CYLINDER: -2.50

## 2025-04-17 ASSESSMENT — TONOMETRY: OS_IOP_MMHG: 12

## 2025-04-17 ASSESSMENT — REFRACTION_MANIFEST
OD_AXIS: 121
OS_SPHERE: +1.50
OS_AXIS: 065
OS_VA1: 20/50
OS_CYLINDER: -2.50
OD_CYLINDER: -1.25
OD_SPHERE: +0.25

## 2025-04-17 ASSESSMENT — CONFRONTATIONAL VISUAL FIELD TEST (CVF)
OS_FINDINGS: FULL
OD_FINDINGS: FULL

## 2025-04-17 ASSESSMENT — LID EXAM ASSESSMENTS
OS_BLEPHARITIS: LLL LUL 1+
OD_BLEPHARITIS: RLL RUL 1+

## 2025-04-17 ASSESSMENT — VISUAL ACUITY
OS_BCVA: 20/50-1
OD_BCVA: 20/50

## 2025-05-20 ENCOUNTER — OUTPATIENT (OUTPATIENT)
Dept: OUTPATIENT SERVICES | Facility: HOSPITAL | Age: 89
LOS: 1 days | Discharge: HOME | End: 2025-05-20

## 2025-05-20 DIAGNOSIS — C61 MALIGNANT NEOPLASM OF PROSTATE: ICD-10-CM

## 2025-05-21 ENCOUNTER — APPOINTMENT (OUTPATIENT)
Age: 89
End: 2025-05-21

## 2025-05-21 ENCOUNTER — RESULT REVIEW (OUTPATIENT)
Age: 89
End: 2025-05-21

## 2025-05-21 ENCOUNTER — APPOINTMENT (OUTPATIENT)
Dept: HEMATOLOGY ONCOLOGY | Facility: CLINIC | Age: 89
End: 2025-05-21
Payer: MEDICARE

## 2025-05-21 VITALS
TEMPERATURE: 97.6 F | HEIGHT: 61 IN | HEART RATE: 72 BPM | WEIGHT: 152 LBS | SYSTOLIC BLOOD PRESSURE: 137 MMHG | OXYGEN SATURATION: 99 % | BODY MASS INDEX: 28.7 KG/M2 | DIASTOLIC BLOOD PRESSURE: 77 MMHG

## 2025-05-21 DIAGNOSIS — C79.51 SECONDARY MALIGNANT NEOPLASM OF BONE: ICD-10-CM

## 2025-05-21 DIAGNOSIS — C61 MALIGNANT NEOPLASM OF PROSTATE: ICD-10-CM

## 2025-05-21 LAB
BASOPHILS # BLD AUTO: 0.04 K/UL — SIGNIFICANT CHANGE UP (ref 0–0.2)
BASOPHILS NFR BLD AUTO: 0.7 % — SIGNIFICANT CHANGE UP (ref 0–2)
EOSINOPHIL # BLD AUTO: 0.11 K/UL — SIGNIFICANT CHANGE UP (ref 0–0.5)
EOSINOPHIL NFR BLD AUTO: 2 % — SIGNIFICANT CHANGE UP (ref 0–6)
HCT VFR BLD CALC: 38.1 % — LOW (ref 39–50)
HGB BLD-MCNC: 11.6 G/DL — LOW (ref 13–17)
IMM GRANULOCYTES # BLD AUTO: 0.01 K/UL — SIGNIFICANT CHANGE UP (ref 0–0.07)
IMM GRANULOCYTES NFR BLD AUTO: 0.2 % — SIGNIFICANT CHANGE UP (ref 0–0.9)
LYMPHOCYTES # BLD AUTO: 0.81 K/UL — LOW (ref 1–3.3)
LYMPHOCYTES NFR BLD AUTO: 14.5 % — SIGNIFICANT CHANGE UP (ref 13–44)
MCHC RBC-ENTMCNC: 29.3 PG — SIGNIFICANT CHANGE UP (ref 27–34)
MCHC RBC-ENTMCNC: 30.4 G/DL — LOW (ref 32–36)
MCV RBC AUTO: 96.2 FL — SIGNIFICANT CHANGE UP (ref 80–100)
MONOCYTES # BLD AUTO: 0.46 K/UL — SIGNIFICANT CHANGE UP (ref 0–0.9)
MONOCYTES NFR BLD AUTO: 8.3 % — SIGNIFICANT CHANGE UP (ref 2–14)
NEUTROPHILS # BLD AUTO: 4.14 K/UL — SIGNIFICANT CHANGE UP (ref 1.8–7.4)
NEUTROPHILS NFR BLD AUTO: 74.3 % — SIGNIFICANT CHANGE UP (ref 43–77)
NRBC # BLD AUTO: 0 K/UL — SIGNIFICANT CHANGE UP (ref 0–0)
NRBC # FLD: 0 K/UL — SIGNIFICANT CHANGE UP (ref 0–0)
NRBC BLD AUTO-RTO: 0 /100 WBCS — SIGNIFICANT CHANGE UP (ref 0–0)
PLATELET # BLD AUTO: 131 K/UL — LOW (ref 150–400)
PMV BLD: 10.2 FL — SIGNIFICANT CHANGE UP (ref 7–13)
RBC # BLD: 3.96 M/UL — LOW (ref 4.2–5.8)
RBC # FLD: 15.3 % — HIGH (ref 10.3–14.5)
WBC # BLD: 5.57 K/UL — SIGNIFICANT CHANGE UP (ref 3.8–10.5)
WBC # FLD AUTO: 5.57 K/UL — SIGNIFICANT CHANGE UP (ref 3.8–10.5)

## 2025-05-21 PROCEDURE — 99214 OFFICE O/P EST MOD 30 MIN: CPT

## 2025-05-22 LAB
ALBUMIN SERPL ELPH-MCNC: 4.4 G/DL
ALP BLD-CCNC: 83 U/L
ALT SERPL-CCNC: 26 U/L
ANION GAP SERPL CALC-SCNC: 13 MMOL/L
AST SERPL-CCNC: 34 U/L
BILIRUB SERPL-MCNC: 0.7 MG/DL
BUN SERPL-MCNC: 33 MG/DL
CALCIUM SERPL-MCNC: 9.1 MG/DL
CHLORIDE SERPL-SCNC: 107 MMOL/L
CO2 SERPL-SCNC: 24 MMOL/L
CREAT SERPL-MCNC: 1.5 MG/DL
EGFRCR SERPLBLD CKD-EPI 2021: 44 ML/MIN/1.73M2
GLUCOSE SERPL-MCNC: 108 MG/DL
POTASSIUM SERPL-SCNC: 5.8 MMOL/L
PROT SERPL-MCNC: 7.2 G/DL
PSA SERPL-MCNC: 1.67 NG/ML
SODIUM SERPL-SCNC: 144 MMOL/L

## 2025-07-01 ENCOUNTER — APPOINTMENT (OUTPATIENT)
Dept: HEMATOLOGY ONCOLOGY | Facility: CLINIC | Age: 89
End: 2025-07-01

## 2025-07-01 ENCOUNTER — RESULT REVIEW (OUTPATIENT)
Age: 89
End: 2025-07-01

## 2025-07-01 LAB
BASOPHILS # BLD AUTO: 0.06 K/UL — SIGNIFICANT CHANGE UP (ref 0–0.2)
BASOPHILS NFR BLD AUTO: 1.1 % — SIGNIFICANT CHANGE UP (ref 0–2)
EOSINOPHIL # BLD AUTO: 0.22 K/UL — SIGNIFICANT CHANGE UP (ref 0–0.5)
EOSINOPHIL NFR BLD AUTO: 4.1 % — SIGNIFICANT CHANGE UP (ref 0–6)
HCT VFR BLD CALC: 39.6 % — SIGNIFICANT CHANGE UP (ref 39–50)
HGB BLD-MCNC: 12.1 G/DL — LOW (ref 13–17)
IMM GRANULOCYTES # BLD AUTO: 0.02 K/UL — SIGNIFICANT CHANGE UP (ref 0–0.07)
IMM GRANULOCYTES NFR BLD AUTO: 0.4 % — SIGNIFICANT CHANGE UP (ref 0–0.9)
LYMPHOCYTES # BLD AUTO: 0.81 K/UL — LOW (ref 1–3.3)
LYMPHOCYTES NFR BLD AUTO: 15.2 % — SIGNIFICANT CHANGE UP (ref 13–44)
MCHC RBC-ENTMCNC: 29.4 PG — SIGNIFICANT CHANGE UP (ref 27–34)
MCHC RBC-ENTMCNC: 30.6 G/DL — LOW (ref 32–36)
MCV RBC AUTO: 96.4 FL — SIGNIFICANT CHANGE UP (ref 80–100)
MONOCYTES # BLD AUTO: 0.54 K/UL — SIGNIFICANT CHANGE UP (ref 0–0.9)
MONOCYTES NFR BLD AUTO: 10.1 % — SIGNIFICANT CHANGE UP (ref 2–14)
NEUTROPHILS # BLD AUTO: 3.68 K/UL — SIGNIFICANT CHANGE UP (ref 1.8–7.4)
NEUTROPHILS NFR BLD AUTO: 69.1 % — SIGNIFICANT CHANGE UP (ref 43–77)
NRBC # BLD AUTO: 0 K/UL — SIGNIFICANT CHANGE UP (ref 0–0)
NRBC # FLD: 0 K/UL — SIGNIFICANT CHANGE UP (ref 0–0)
NRBC BLD AUTO-RTO: 0 /100 WBCS — SIGNIFICANT CHANGE UP (ref 0–0)
PLATELET # BLD AUTO: 202 K/UL — SIGNIFICANT CHANGE UP (ref 150–400)
PMV BLD: 8.6 FL — SIGNIFICANT CHANGE UP (ref 7–13)
RBC # BLD: 4.11 M/UL — LOW (ref 4.2–5.8)
RBC # FLD: 15.9 % — HIGH (ref 10.3–14.5)
WBC # BLD: 5.33 K/UL — SIGNIFICANT CHANGE UP (ref 3.8–10.5)
WBC # FLD AUTO: 5.33 K/UL — SIGNIFICANT CHANGE UP (ref 3.8–10.5)

## 2025-07-02 LAB
ALBUMIN SERPL ELPH-MCNC: 4.3 G/DL
ALP BLD-CCNC: 76 U/L
ALT SERPL-CCNC: 19 U/L
ANION GAP SERPL CALC-SCNC: 14 MMOL/L
AST SERPL-CCNC: 27 U/L
BILIRUB SERPL-MCNC: 1 MG/DL
BUN SERPL-MCNC: 31 MG/DL
CALCIUM SERPL-MCNC: 9.4 MG/DL
CHLORIDE SERPL-SCNC: 107 MMOL/L
CO2 SERPL-SCNC: 25 MMOL/L
CREAT SERPL-MCNC: 1.71 MG/DL
EGFRCR SERPLBLD CKD-EPI 2021: 37 ML/MIN/1.73M2
GLUCOSE SERPL-MCNC: 90 MG/DL
POTASSIUM SERPL-SCNC: 5.4 MMOL/L
PROT SERPL-MCNC: 6.9 G/DL
PSA SERPL-MCNC: 2.05 NG/ML
SODIUM SERPL-SCNC: 146 MMOL/L

## 2025-07-05 LAB
TESTOST FREE SERPL-MCNC: 0 PG/ML
TESTOST SERPL-MCNC: <2.5 NG/DL

## 2025-07-14 ENCOUNTER — OUTPATIENT (OUTPATIENT)
Dept: OUTPATIENT SERVICES | Facility: HOSPITAL | Age: 89
LOS: 1 days | Discharge: HOME | End: 2025-07-14

## 2025-07-14 DIAGNOSIS — C61 MALIGNANT NEOPLASM OF PROSTATE: ICD-10-CM

## 2025-07-14 DIAGNOSIS — C79.51 SECONDARY MALIGNANT NEOPLASM OF BONE: ICD-10-CM

## 2025-07-16 ENCOUNTER — APPOINTMENT (OUTPATIENT)
Dept: HEMATOLOGY ONCOLOGY | Facility: CLINIC | Age: 89
End: 2025-07-16
Payer: MEDICARE

## 2025-07-16 VITALS
OXYGEN SATURATION: 95 % | TEMPERATURE: 97.7 F | WEIGHT: 152 LBS | SYSTOLIC BLOOD PRESSURE: 119 MMHG | HEART RATE: 75 BPM | HEIGHT: 61 IN | DIASTOLIC BLOOD PRESSURE: 71 MMHG | BODY MASS INDEX: 28.7 KG/M2

## 2025-07-16 PROCEDURE — 99214 OFFICE O/P EST MOD 30 MIN: CPT

## 2025-08-18 ENCOUNTER — APPOINTMENT (OUTPATIENT)
Dept: HEMATOLOGY ONCOLOGY | Facility: CLINIC | Age: 89
End: 2025-08-18

## 2025-08-18 ENCOUNTER — RESULT REVIEW (OUTPATIENT)
Age: 89
End: 2025-08-18

## 2025-08-18 LAB
BASOPHILS # BLD AUTO: 0.03 K/UL — SIGNIFICANT CHANGE UP (ref 0–0.2)
BASOPHILS NFR BLD AUTO: 0.7 % — SIGNIFICANT CHANGE UP (ref 0–2)
EOSINOPHIL # BLD AUTO: 0.1 K/UL — SIGNIFICANT CHANGE UP (ref 0–0.5)
EOSINOPHIL NFR BLD AUTO: 2.2 % — SIGNIFICANT CHANGE UP (ref 0–6)
HCT VFR BLD CALC: 36.7 % — LOW (ref 39–50)
HGB BLD-MCNC: 11.4 G/DL — LOW (ref 13–17)
IMM GRANULOCYTES # BLD AUTO: 0.02 K/UL — SIGNIFICANT CHANGE UP (ref 0–0.07)
IMM GRANULOCYTES NFR BLD AUTO: 0.4 % — SIGNIFICANT CHANGE UP (ref 0–0.9)
LYMPHOCYTES # BLD AUTO: 0.58 K/UL — LOW (ref 1–3.3)
LYMPHOCYTES NFR BLD AUTO: 12.7 % — LOW (ref 13–44)
MCHC RBC-ENTMCNC: 29.1 PG — SIGNIFICANT CHANGE UP (ref 27–34)
MCHC RBC-ENTMCNC: 31.1 G/DL — LOW (ref 32–36)
MCV RBC AUTO: 93.6 FL — SIGNIFICANT CHANGE UP (ref 80–100)
MONOCYTES # BLD AUTO: 0.35 K/UL — SIGNIFICANT CHANGE UP (ref 0–0.9)
MONOCYTES NFR BLD AUTO: 7.6 % — SIGNIFICANT CHANGE UP (ref 2–14)
NEUTROPHILS # BLD AUTO: 3.5 K/UL — SIGNIFICANT CHANGE UP (ref 1.8–7.4)
NEUTROPHILS NFR BLD AUTO: 76.4 % — SIGNIFICANT CHANGE UP (ref 43–77)
NRBC # BLD AUTO: 0 K/UL — SIGNIFICANT CHANGE UP (ref 0–0)
NRBC # FLD: 0 K/UL — SIGNIFICANT CHANGE UP (ref 0–0)
NRBC BLD AUTO-RTO: 0 /100 WBCS — SIGNIFICANT CHANGE UP (ref 0–0)
PLATELET # BLD AUTO: 146 K/UL — LOW (ref 150–400)
PMV BLD: 9.6 FL — SIGNIFICANT CHANGE UP (ref 7–13)
RBC # BLD: 3.92 M/UL — LOW (ref 4.2–5.8)
RBC # FLD: 15.8 % — HIGH (ref 10.3–14.5)
WBC # BLD: 4.58 K/UL — SIGNIFICANT CHANGE UP (ref 3.8–10.5)
WBC # FLD AUTO: 4.58 K/UL — SIGNIFICANT CHANGE UP (ref 3.8–10.5)

## 2025-08-20 ENCOUNTER — APPOINTMENT (OUTPATIENT)
Age: 89
End: 2025-08-20

## 2025-08-22 LAB
ALBUMIN SERPL ELPH-MCNC: 4.1 G/DL
ALP BLD-CCNC: 84 U/L
ALT SERPL-CCNC: 16 U/L
ANION GAP SERPL CALC-SCNC: 13 MMOL/L
AST SERPL-CCNC: 26 U/L
BILIRUB SERPL-MCNC: 0.9 MG/DL
BUN SERPL-MCNC: 35 MG/DL
CALCIUM SERPL-MCNC: 9 MG/DL
CHLORIDE SERPL-SCNC: 105 MMOL/L
CO2 SERPL-SCNC: 24 MMOL/L
CREAT SERPL-MCNC: 1.4 MG/DL
EGFRCR SERPLBLD CKD-EPI 2021: 47 ML/MIN/1.73M2
GLUCOSE SERPL-MCNC: 108 MG/DL
POTASSIUM SERPL-SCNC: 5.3 MMOL/L
PROT SERPL-MCNC: 7.4 G/DL
PSA SERPL-MCNC: 3.04 NG/ML
SODIUM SERPL-SCNC: 142 MMOL/L
TESTOST FREE SERPL-MCNC: 0 PG/ML
TESTOST SERPL-MCNC: <2.5 NG/DL

## 2025-09-10 ENCOUNTER — RESULT REVIEW (OUTPATIENT)
Age: 89
End: 2025-09-10

## 2025-09-10 ENCOUNTER — APPOINTMENT (OUTPATIENT)
Dept: HEMATOLOGY ONCOLOGY | Facility: CLINIC | Age: 89
End: 2025-09-10
Payer: MEDICARE

## 2025-09-10 VITALS
DIASTOLIC BLOOD PRESSURE: 73 MMHG | HEIGHT: 73 IN | HEART RATE: 72 BPM | SYSTOLIC BLOOD PRESSURE: 148 MMHG | OXYGEN SATURATION: 93 % | WEIGHT: 145 LBS | BODY MASS INDEX: 19.22 KG/M2

## 2025-09-10 DIAGNOSIS — C61 MALIGNANT NEOPLASM OF PROSTATE: ICD-10-CM

## 2025-09-10 LAB
BASOPHILS # BLD AUTO: 0.05 K/UL — SIGNIFICANT CHANGE UP (ref 0–0.2)
BASOPHILS NFR BLD AUTO: 1 % — SIGNIFICANT CHANGE UP (ref 0–2)
EOSINOPHIL # BLD AUTO: 0.23 K/UL — SIGNIFICANT CHANGE UP (ref 0–0.5)
EOSINOPHIL NFR BLD AUTO: 4.5 % — SIGNIFICANT CHANGE UP (ref 0–6)
HCT VFR BLD CALC: 34.8 % — LOW (ref 39–50)
HGB BLD-MCNC: 10.8 G/DL — LOW (ref 13–17)
IMM GRANULOCYTES # BLD AUTO: 0.02 K/UL — SIGNIFICANT CHANGE UP (ref 0–0.07)
IMM GRANULOCYTES NFR BLD AUTO: 0.4 % — SIGNIFICANT CHANGE UP (ref 0–0.9)
LYMPHOCYTES # BLD AUTO: 0.61 K/UL — LOW (ref 1–3.3)
LYMPHOCYTES NFR BLD AUTO: 12 % — LOW (ref 13–44)
MCHC RBC-ENTMCNC: 28.1 PG — SIGNIFICANT CHANGE UP (ref 27–34)
MCHC RBC-ENTMCNC: 31 G/DL — LOW (ref 32–36)
MCV RBC AUTO: 90.6 FL — SIGNIFICANT CHANGE UP (ref 80–100)
MONOCYTES # BLD AUTO: 0.44 K/UL — SIGNIFICANT CHANGE UP (ref 0–0.9)
MONOCYTES NFR BLD AUTO: 8.7 % — SIGNIFICANT CHANGE UP (ref 2–14)
NEUTROPHILS # BLD AUTO: 3.73 K/UL — SIGNIFICANT CHANGE UP (ref 1.8–7.4)
NEUTROPHILS NFR BLD AUTO: 73.4 % — SIGNIFICANT CHANGE UP (ref 43–77)
NRBC # BLD AUTO: 0 K/UL — SIGNIFICANT CHANGE UP (ref 0–0)
NRBC # FLD: 0 K/UL — SIGNIFICANT CHANGE UP (ref 0–0)
NRBC BLD AUTO-RTO: 0 /100 WBCS — SIGNIFICANT CHANGE UP (ref 0–0)
PLATELET # BLD AUTO: 204 K/UL — SIGNIFICANT CHANGE UP (ref 150–400)
PMV BLD: 8.2 FL — SIGNIFICANT CHANGE UP (ref 7–13)
RBC # BLD: 3.84 M/UL — LOW (ref 4.2–5.8)
RBC # FLD: 15.5 % — HIGH (ref 10.3–14.5)
WBC # BLD: 5.08 K/UL — SIGNIFICANT CHANGE UP (ref 3.8–10.5)
WBC # FLD AUTO: 5.08 K/UL — SIGNIFICANT CHANGE UP (ref 3.8–10.5)

## 2025-09-10 PROCEDURE — 99214 OFFICE O/P EST MOD 30 MIN: CPT

## 2025-09-10 RX ORDER — MIRTAZAPINE 15 MG/1
15 TABLET, FILM COATED ORAL
Qty: 30 | Refills: 3 | Status: ACTIVE | COMMUNITY
Start: 2025-09-10 | End: 1900-01-01

## 2025-09-11 LAB
ALBUMIN SERPL ELPH-MCNC: 3.9 G/DL
ALP BLD-CCNC: 92 U/L
ALT SERPL-CCNC: 14 U/L
ANION GAP SERPL CALC-SCNC: 12 MMOL/L
AST SERPL-CCNC: 26 U/L
BILIRUB SERPL-MCNC: 0.8 MG/DL
BUN SERPL-MCNC: 29 MG/DL
CALCIUM SERPL-MCNC: 9 MG/DL
CHLORIDE SERPL-SCNC: 104 MMOL/L
CO2 SERPL-SCNC: 23 MMOL/L
CREAT SERPL-MCNC: 1.28 MG/DL
EGFRCR SERPLBLD CKD-EPI 2021: 53 ML/MIN/1.73M2
GLUCOSE SERPL-MCNC: 109 MG/DL
POTASSIUM SERPL-SCNC: 5.5 MMOL/L
PROT SERPL-MCNC: 6.7 G/DL
PSA SERPL-MCNC: 3.02 NG/ML
SODIUM SERPL-SCNC: 139 MMOL/L